# Patient Record
Sex: MALE | Race: WHITE | Employment: FULL TIME | URBAN - METROPOLITAN AREA
[De-identification: names, ages, dates, MRNs, and addresses within clinical notes are randomized per-mention and may not be internally consistent; named-entity substitution may affect disease eponyms.]

---

## 2017-08-16 ENCOUNTER — HOSPITAL ENCOUNTER (OUTPATIENT)
Facility: CLINIC | Age: 67
Discharge: HOME | End: 2017-08-16
Payer: COMMERCIAL

## 2017-08-16 ENCOUNTER — HOSPITAL ENCOUNTER (OUTPATIENT)
Dept: GENERAL RADIOLOGY | Facility: CLINIC | Age: 67
Discharge: HOME | End: 2017-08-16
Payer: COMMERCIAL

## 2017-08-16 DIAGNOSIS — M25.512 LEFT SHOULDER PAIN, UNSPECIFIED CHRONICITY: ICD-10-CM

## 2017-08-16 PROCEDURE — 73030 X-RAY EXAM OF SHOULDER: CPT

## 2017-08-17 ENCOUNTER — HOSPITAL ENCOUNTER (OUTPATIENT)
Facility: CLINIC | Age: 67
Discharge: HOME | End: 2017-08-17
Payer: COMMERCIAL

## 2017-08-17 ENCOUNTER — TELEPHONE (OUTPATIENT)
Dept: PRIMARY CARE CLINIC | Age: 67
End: 2017-08-17

## 2017-08-17 DIAGNOSIS — E11.9 TYPE 2 DIABETES MELLITUS WITHOUT COMPLICATION, WITH LONG-TERM CURRENT USE OF INSULIN (HCC): ICD-10-CM

## 2017-08-17 DIAGNOSIS — I10 ESSENTIAL HYPERTENSION: Primary | ICD-10-CM

## 2017-08-17 DIAGNOSIS — Z79.4 TYPE 2 DIABETES MELLITUS WITHOUT COMPLICATION, WITH LONG-TERM CURRENT USE OF INSULIN (HCC): ICD-10-CM

## 2017-08-17 LAB
ANION GAP SERPL CALCULATED.3IONS-SCNC: 14 MMOL/L (ref 9–17)
BUN BLDV-MCNC: 25 MG/DL (ref 8–23)
BUN/CREAT BLD: ABNORMAL (ref 9–20)
CALCIUM SERPL-MCNC: 9.6 MG/DL (ref 8.6–10.4)
CHLORIDE BLD-SCNC: 98 MMOL/L (ref 98–107)
CO2: 29 MMOL/L (ref 20–31)
CREAT SERPL-MCNC: 0.96 MG/DL (ref 0.7–1.2)
GFR AFRICAN AMERICAN: >60 ML/MIN
GFR NON-AFRICAN AMERICAN: >60 ML/MIN
GFR SERPL CREATININE-BSD FRML MDRD: ABNORMAL ML/MIN/{1.73_M2}
GFR SERPL CREATININE-BSD FRML MDRD: ABNORMAL ML/MIN/{1.73_M2}
GLUCOSE BLD-MCNC: 233 MG/DL (ref 70–99)
MAGNESIUM: 2.5 MG/DL (ref 1.6–2.6)
POTASSIUM SERPL-SCNC: 5.2 MMOL/L (ref 3.7–5.3)
SODIUM BLD-SCNC: 141 MMOL/L (ref 135–144)

## 2017-08-17 PROCEDURE — 83735 ASSAY OF MAGNESIUM: CPT

## 2017-08-17 PROCEDURE — 80048 BASIC METABOLIC PNL TOTAL CA: CPT

## 2017-08-17 PROCEDURE — 36415 COLL VENOUS BLD VENIPUNCTURE: CPT

## 2017-08-24 ENCOUNTER — TELEPHONE (OUTPATIENT)
Dept: PRIMARY CARE CLINIC | Age: 67
End: 2017-08-24

## 2017-08-24 DIAGNOSIS — I25.10 CORONARY ARTERY DISEASE INVOLVING NATIVE CORONARY ARTERY OF NATIVE HEART WITHOUT ANGINA PECTORIS: ICD-10-CM

## 2017-08-24 DIAGNOSIS — I10 ESSENTIAL HYPERTENSION: Primary | ICD-10-CM

## 2017-08-24 DIAGNOSIS — Z79.4 TYPE 2 DIABETES MELLITUS WITHOUT COMPLICATION, WITH LONG-TERM CURRENT USE OF INSULIN (HCC): ICD-10-CM

## 2017-08-24 DIAGNOSIS — E11.9 TYPE 2 DIABETES MELLITUS WITHOUT COMPLICATION, WITH LONG-TERM CURRENT USE OF INSULIN (HCC): ICD-10-CM

## 2017-09-05 DIAGNOSIS — I10 ESSENTIAL HYPERTENSION: ICD-10-CM

## 2017-09-05 RX ORDER — CARVEDILOL 25 MG/1
25 TABLET ORAL 2 TIMES DAILY
Qty: 180 TABLET | Refills: 3 | Status: SHIPPED | OUTPATIENT
Start: 2017-09-05

## 2017-09-05 RX ORDER — ISOSORBIDE MONONITRATE 30 MG/1
30 TABLET, EXTENDED RELEASE ORAL DAILY
Qty: 90 TABLET | Refills: 3 | Status: SHIPPED | OUTPATIENT
Start: 2017-09-05

## 2017-09-05 RX ORDER — HYDROCHLOROTHIAZIDE 25 MG/1
50 TABLET ORAL DAILY
Qty: 180 TABLET | Refills: 3 | Status: SHIPPED | OUTPATIENT
Start: 2017-09-05 | End: 2018-11-15

## 2017-09-05 RX ORDER — SPIRONOLACTONE 25 MG/1
25 TABLET ORAL DAILY
Qty: 90 TABLET | Refills: 3 | Status: SHIPPED | OUTPATIENT
Start: 2017-09-05 | End: 2018-11-15 | Stop reason: SDUPTHER

## 2017-09-05 RX ORDER — GLIMEPIRIDE 4 MG/1
4 TABLET ORAL
Qty: 90 TABLET | Refills: 3 | Status: SHIPPED | OUTPATIENT
Start: 2017-09-05

## 2017-12-22 ENCOUNTER — TELEPHONE (OUTPATIENT)
Dept: PRIMARY CARE CLINIC | Age: 67
End: 2017-12-22

## 2017-12-22 DIAGNOSIS — I25.10 CORONARY ARTERY DISEASE INVOLVING NATIVE CORONARY ARTERY OF NATIVE HEART WITHOUT ANGINA PECTORIS: ICD-10-CM

## 2017-12-22 DIAGNOSIS — R97.20 ELEVATED PSA: ICD-10-CM

## 2017-12-22 DIAGNOSIS — E11.9 TYPE 2 DIABETES MELLITUS WITHOUT COMPLICATION, WITH LONG-TERM CURRENT USE OF INSULIN (HCC): ICD-10-CM

## 2017-12-22 DIAGNOSIS — Z79.4 TYPE 2 DIABETES MELLITUS WITHOUT COMPLICATION, WITH LONG-TERM CURRENT USE OF INSULIN (HCC): ICD-10-CM

## 2017-12-22 DIAGNOSIS — E83.42 HYPOMAGNESEMIA: ICD-10-CM

## 2017-12-22 DIAGNOSIS — I10 ESSENTIAL HYPERTENSION: Primary | ICD-10-CM

## 2017-12-29 ENCOUNTER — HOSPITAL ENCOUNTER (OUTPATIENT)
Facility: CLINIC | Age: 67
Discharge: HOME | End: 2017-12-29
Payer: COMMERCIAL

## 2017-12-29 DIAGNOSIS — E11.9 TYPE 2 DIABETES MELLITUS WITHOUT COMPLICATION, WITH LONG-TERM CURRENT USE OF INSULIN (HCC): ICD-10-CM

## 2017-12-29 DIAGNOSIS — Z79.4 TYPE 2 DIABETES MELLITUS WITHOUT COMPLICATION, WITH LONG-TERM CURRENT USE OF INSULIN (HCC): ICD-10-CM

## 2017-12-29 DIAGNOSIS — I10 ESSENTIAL HYPERTENSION: ICD-10-CM

## 2017-12-29 DIAGNOSIS — R97.20 ELEVATED PSA: ICD-10-CM

## 2017-12-29 DIAGNOSIS — I25.10 CORONARY ARTERY DISEASE INVOLVING NATIVE CORONARY ARTERY OF NATIVE HEART WITHOUT ANGINA PECTORIS: ICD-10-CM

## 2017-12-29 DIAGNOSIS — E83.42 HYPOMAGNESEMIA: ICD-10-CM

## 2017-12-29 LAB
ABSOLUTE EOS #: 0.22 K/UL (ref 0–0.44)
ABSOLUTE IMMATURE GRANULOCYTE: <0.03 K/UL (ref 0–0.3)
ABSOLUTE LYMPH #: 2.03 K/UL (ref 1.1–3.7)
ABSOLUTE MONO #: 0.77 K/UL (ref 0.1–1.2)
ALBUMIN SERPL-MCNC: 4 G/DL (ref 3.5–5.2)
ALBUMIN/GLOBULIN RATIO: 1.1 (ref 1–2.5)
ALP BLD-CCNC: 64 U/L (ref 40–129)
ALT SERPL-CCNC: 20 U/L (ref 5–41)
ANION GAP SERPL CALCULATED.3IONS-SCNC: 12 MMOL/L (ref 9–17)
AST SERPL-CCNC: 16 U/L
BASOPHILS # BLD: 1 % (ref 0–2)
BASOPHILS ABSOLUTE: 0.05 K/UL (ref 0–0.2)
BILIRUB SERPL-MCNC: 0.37 MG/DL (ref 0.3–1.2)
BILIRUBIN DIRECT: 0.1 MG/DL
BILIRUBIN, INDIRECT: 0.27 MG/DL (ref 0–1)
BUN BLDV-MCNC: 22 MG/DL (ref 8–23)
CALCIUM SERPL-MCNC: 9.7 MG/DL (ref 8.6–10.4)
CHLORIDE BLD-SCNC: 98 MMOL/L (ref 98–107)
CHOLESTEROL/HDL RATIO: 2.2
CHOLESTEROL: 133 MG/DL
CO2: 30 MMOL/L (ref 20–31)
CREAT SERPL-MCNC: 0.91 MG/DL (ref 0.7–1.2)
CREATININE URINE: 80.3 MG/DL (ref 39–259)
DIFFERENTIAL TYPE: NORMAL
EOSINOPHILS RELATIVE PERCENT: 3 % (ref 1–4)
ESTIMATED AVERAGE GLUCOSE: 154 MG/DL
GFR AFRICAN AMERICAN: >60 ML/MIN
GFR NON-AFRICAN AMERICAN: >60 ML/MIN
GFR SERPL CREATININE-BSD FRML MDRD: ABNORMAL ML/MIN/{1.73_M2}
GFR SERPL CREATININE-BSD FRML MDRD: ABNORMAL ML/MIN/{1.73_M2}
GLUCOSE BLD-MCNC: 117 MG/DL (ref 70–99)
HBA1C MFR BLD: 7 % (ref 4–6)
HCT VFR BLD CALC: 42.3 % (ref 40.7–50.3)
HDLC SERPL-MCNC: 60 MG/DL
HEMOGLOBIN: 13.9 G/DL (ref 13–17)
IMMATURE GRANULOCYTES: 0 %
LDL CHOLESTEROL: 60 MG/DL (ref 0–130)
LYMPHOCYTES # BLD: 29 % (ref 24–43)
MAGNESIUM: 2 MG/DL (ref 1.6–2.6)
MCH RBC QN AUTO: 31 PG (ref 25.2–33.5)
MCHC RBC AUTO-ENTMCNC: 32.9 G/DL (ref 28.4–34.8)
MCV RBC AUTO: 94.4 FL (ref 82.6–102.9)
MICROALBUMIN/CREAT 24H UR: <12 MG/L
MICROALBUMIN/CREAT UR-RTO: NORMAL MCG/MG CREAT
MONOCYTES # BLD: 11 % (ref 3–12)
PDW BLD-RTO: 12.6 % (ref 11.8–14.4)
PLATELET # BLD: 147 K/UL (ref 138–453)
PLATELET ESTIMATE: NORMAL
PMV BLD AUTO: 12.4 FL (ref 8.1–13.5)
POTASSIUM SERPL-SCNC: 4.2 MMOL/L (ref 3.7–5.3)
PROSTATE SPECIFIC ANTIGEN: 8.25 UG/L
RBC # BLD: 4.48 M/UL (ref 4.21–5.77)
RBC # BLD: NORMAL 10*6/UL
SEG NEUTROPHILS: 56 % (ref 36–65)
SEGMENTED NEUTROPHILS ABSOLUTE COUNT: 3.96 K/UL (ref 1.5–8.1)
SODIUM BLD-SCNC: 140 MMOL/L (ref 135–144)
TOTAL PROTEIN: 7.5 G/DL (ref 6.4–8.3)
TRIGL SERPL-MCNC: 65 MG/DL
VLDLC SERPL CALC-MCNC: NORMAL MG/DL (ref 1–30)
WBC # BLD: 7.1 K/UL (ref 3.5–11.3)
WBC # BLD: NORMAL 10*3/UL

## 2017-12-29 PROCEDURE — 83036 HEMOGLOBIN GLYCOSYLATED A1C: CPT

## 2017-12-29 PROCEDURE — 83735 ASSAY OF MAGNESIUM: CPT

## 2017-12-29 PROCEDURE — 36415 COLL VENOUS BLD VENIPUNCTURE: CPT

## 2017-12-29 PROCEDURE — 84154 ASSAY OF PSA FREE: CPT

## 2017-12-29 PROCEDURE — 80061 LIPID PANEL: CPT

## 2017-12-29 PROCEDURE — 82570 ASSAY OF URINE CREATININE: CPT

## 2017-12-29 PROCEDURE — 82043 UR ALBUMIN QUANTITATIVE: CPT

## 2017-12-29 PROCEDURE — 80053 COMPREHEN METABOLIC PANEL: CPT

## 2017-12-29 PROCEDURE — 84153 ASSAY OF PSA TOTAL: CPT

## 2017-12-29 PROCEDURE — 82248 BILIRUBIN DIRECT: CPT

## 2017-12-29 PROCEDURE — 85025 COMPLETE CBC W/AUTO DIFF WBC: CPT

## 2018-01-02 LAB
PROSTATE SPECIFIC ANTIGEN FREE: 1.5 UG/L
PROSTATE SPECIFIC ANTIGEN PERCENT FREE: 22.1 %
PROSTATE SPECIFIC ANTIGEN: 6.8 UG/L (ref 0–4)

## 2018-08-16 ENCOUNTER — HOSPITAL ENCOUNTER (EMERGENCY)
Facility: CLINIC | Age: 68
Discharge: HOME | End: 2018-08-16
Attending: EMERGENCY MEDICINE
Payer: COMMERCIAL

## 2018-08-16 VITALS
RESPIRATION RATE: 16 BRPM | SYSTOLIC BLOOD PRESSURE: 163 MMHG | BODY MASS INDEX: 30.68 KG/M2 | HEART RATE: 63 BPM | DIASTOLIC BLOOD PRESSURE: 73 MMHG | TEMPERATURE: 98.6 F | OXYGEN SATURATION: 99 % | WEIGHT: 220 LBS

## 2018-08-16 DIAGNOSIS — H61.22 IMPACTED CERUMEN, LEFT EAR: Primary | ICD-10-CM

## 2018-08-16 PROCEDURE — 69210 REMOVE IMPACTED EAR WAX UNI: CPT

## 2018-08-16 PROCEDURE — 99282 EMERGENCY DEPT VISIT SF MDM: CPT

## 2018-08-17 ENCOUNTER — CARE COORDINATION (OUTPATIENT)
Dept: CARE COORDINATION | Age: 68
End: 2018-08-17

## 2018-10-22 ENCOUNTER — HOSPITAL ENCOUNTER (EMERGENCY)
Age: 68
Discharge: HOME | End: 2018-10-22
Attending: EMERGENCY MEDICINE
Payer: COMMERCIAL

## 2018-10-22 ENCOUNTER — APPOINTMENT (OUTPATIENT)
Dept: GENERAL RADIOLOGY | Age: 68
End: 2018-10-22
Payer: COMMERCIAL

## 2018-10-22 VITALS
HEIGHT: 70 IN | TEMPERATURE: 98.2 F | RESPIRATION RATE: 17 BRPM | SYSTOLIC BLOOD PRESSURE: 145 MMHG | WEIGHT: 205 LBS | OXYGEN SATURATION: 93 % | HEART RATE: 63 BPM | DIASTOLIC BLOOD PRESSURE: 65 MMHG | BODY MASS INDEX: 29.35 KG/M2

## 2018-10-22 DIAGNOSIS — I10 HYPERTENSION, UNSPECIFIED TYPE: ICD-10-CM

## 2018-10-22 DIAGNOSIS — R51.9 NONINTRACTABLE HEADACHE, UNSPECIFIED CHRONICITY PATTERN, UNSPECIFIED HEADACHE TYPE: Primary | ICD-10-CM

## 2018-10-22 LAB
ABSOLUTE EOS #: 0.1 K/UL (ref 0–0.4)
ABSOLUTE IMMATURE GRANULOCYTE: ABNORMAL K/UL (ref 0–0.3)
ABSOLUTE LYMPH #: 2.4 K/UL (ref 1–4.8)
ABSOLUTE MONO #: 0.8 K/UL (ref 0.2–0.8)
ALBUMIN SERPL-MCNC: 4.2 G/DL (ref 3.5–5.2)
ALBUMIN/GLOBULIN RATIO: ABNORMAL (ref 1–2.5)
ALP BLD-CCNC: 74 U/L (ref 40–129)
ALT SERPL-CCNC: 31 U/L (ref 5–41)
ANION GAP SERPL CALCULATED.3IONS-SCNC: 11 MMOL/L (ref 9–17)
AST SERPL-CCNC: 21 U/L
BASOPHILS # BLD: 0 % (ref 0–2)
BASOPHILS ABSOLUTE: 0 K/UL (ref 0–0.2)
BILIRUB SERPL-MCNC: 0.4 MG/DL (ref 0.3–1.2)
BNP INTERPRETATION: NORMAL
BUN BLDV-MCNC: 21 MG/DL (ref 8–23)
BUN/CREAT BLD: 21 (ref 9–20)
CALCIUM SERPL-MCNC: 9.4 MG/DL (ref 8.6–10.4)
CHLORIDE BLD-SCNC: 98 MMOL/L (ref 98–107)
CO2: 27 MMOL/L (ref 20–31)
CREAT SERPL-MCNC: 0.98 MG/DL (ref 0.7–1.2)
DIFFERENTIAL TYPE: ABNORMAL
EKG ATRIAL RATE: 65 BPM
EKG P AXIS: 53 DEGREES
EKG P-R INTERVAL: 150 MS
EKG Q-T INTERVAL: 396 MS
EKG QRS DURATION: 92 MS
EKG QTC CALCULATION (BAZETT): 411 MS
EKG R AXIS: -24 DEGREES
EKG T AXIS: 58 DEGREES
EKG VENTRICULAR RATE: 65 BPM
EOSINOPHILS RELATIVE PERCENT: 1 % (ref 1–4)
GFR AFRICAN AMERICAN: >60 ML/MIN
GFR NON-AFRICAN AMERICAN: >60 ML/MIN
GFR SERPL CREATININE-BSD FRML MDRD: ABNORMAL ML/MIN/{1.73_M2}
GFR SERPL CREATININE-BSD FRML MDRD: ABNORMAL ML/MIN/{1.73_M2}
GLUCOSE BLD-MCNC: 196 MG/DL (ref 70–99)
HCT VFR BLD CALC: 41 % (ref 41–53)
HEMOGLOBIN: 13.7 G/DL (ref 13.5–17.5)
IMMATURE GRANULOCYTES: ABNORMAL %
LYMPHOCYTES # BLD: 28 % (ref 24–44)
MCH RBC QN AUTO: 31.2 PG (ref 26–34)
MCHC RBC AUTO-ENTMCNC: 33.5 G/DL (ref 31–37)
MCV RBC AUTO: 92.9 FL (ref 80–100)
MONOCYTES # BLD: 10 % (ref 1–7)
NRBC AUTOMATED: ABNORMAL PER 100 WBC
PDW BLD-RTO: 13.4 % (ref 11.5–14.5)
PLATELET # BLD: 180 K/UL (ref 130–400)
PLATELET ESTIMATE: ABNORMAL
PMV BLD AUTO: 9.4 FL (ref 6–12)
POTASSIUM SERPL-SCNC: 3.9 MMOL/L (ref 3.7–5.3)
PRO-BNP: 175 PG/ML
RBC # BLD: 4.41 M/UL (ref 4.5–5.9)
RBC # BLD: ABNORMAL 10*6/UL
SEG NEUTROPHILS: 61 % (ref 36–66)
SEGMENTED NEUTROPHILS ABSOLUTE COUNT: 5.2 K/UL (ref 1.8–7.7)
SODIUM BLD-SCNC: 136 MMOL/L (ref 135–144)
TOTAL PROTEIN: 7.7 G/DL (ref 6.4–8.3)
TROPONIN INTERP: NORMAL
TROPONIN T: <0.03 NG/ML
WBC # BLD: 8.5 K/UL (ref 3.5–11)
WBC # BLD: ABNORMAL 10*3/UL

## 2018-10-22 PROCEDURE — 93005 ELECTROCARDIOGRAM TRACING: CPT

## 2018-10-22 PROCEDURE — 85025 COMPLETE CBC W/AUTO DIFF WBC: CPT

## 2018-10-22 PROCEDURE — 99285 EMERGENCY DEPT VISIT HI MDM: CPT

## 2018-10-22 PROCEDURE — 80053 COMPREHEN METABOLIC PANEL: CPT

## 2018-10-22 PROCEDURE — 71045 X-RAY EXAM CHEST 1 VIEW: CPT

## 2018-10-22 PROCEDURE — 6370000000 HC RX 637 (ALT 250 FOR IP): Performed by: EMERGENCY MEDICINE

## 2018-10-22 PROCEDURE — 84484 ASSAY OF TROPONIN QUANT: CPT

## 2018-10-22 PROCEDURE — 81003 URINALYSIS AUTO W/O SCOPE: CPT

## 2018-10-22 PROCEDURE — 83880 ASSAY OF NATRIURETIC PEPTIDE: CPT

## 2018-10-22 RX ORDER — FUROSEMIDE 40 MG/1
40 TABLET ORAL DAILY
Status: DISCONTINUED | OUTPATIENT
Start: 2018-10-22 | End: 2018-10-22 | Stop reason: HOSPADM

## 2018-10-22 RX ADMIN — FUROSEMIDE 40 MG: 40 TABLET ORAL at 19:15

## 2018-10-22 NOTE — ED PROVIDER NOTES
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 He has also noticed increased lower extremity edema. He does have a history of mild LV dysfunction. He denies any chest pain. He found his blood pressure to be significantly elevated today while at work. He admits that he woke up feeling more stress today which often leads his blood pressure to be elevated. He has not missed any doses of his antihypertensives. He does admit to eating more salty foods over the past week. He denies any shortness of breath at rest.     Nursing Notes were reviewed. ALLERGIES     Ace inhibitors; Diovan [valsartan]; Januvia [sitagliptin];  Lisinopril; Peanuts [peanut oil]; and Tekturna [aliskiren]    CURRENT MEDICATIONS       Discharge Medication List as of 10/22/2018  7:16 PM      CONTINUE these medications which have NOT CHANGED    Details   glimepiride (AMARYL) 4 MG tablet Take 1 tablet by mouth every morning (before breakfast), Disp-90 tablet, R-3Normal      spironolactone (ALDACTONE) 25 MG tablet Take 1 tablet by mouth daily, Disp-90 tablet, R-3Normal      isosorbide mononitrate (IMDUR) 30 MG extended release tablet Take 1 tablet by mouth daily, Disp-90 tablet, R-3Normal      hydrochlorothiazide (HYDRODIURIL) 25 MG tablet Take 2 tablets by mouth daily, Disp-180 tablet, R-3Normal      carvedilol (COREG) 25 MG tablet Take 1 tablet by mouth 2 times daily, Disp-180 tablet, R-3Normal      potassium chloride SA (K-DUR;KLOR-CON M) 20 MEQ tablet R-0      magnesium oxide (MAG-OX) 400 MG tablet Take 400 mg by mouth 2 times daily      aspirin 81 MG tablet Take 81 mg by mouth daily      celecoxib (CELEBREX) 100 MG capsule Take 100 mg by mouth 2 times daily      Hydrazine Sulfate KALI 25 mg by Does not apply route 4 times daily      omeprazole (PRILOSEC) 20 MG capsule Take 40 mg by mouth daily      tamsulosin (FLOMAX) 0.4 MG capsule Take 0.4 mg by mouth 2 times daily      clopidogrel (PLAVIX) 75 MG tablet Take 75 mg by mouth daily      folic acid (FOLVITE) 028 MCG tablet Take 800 mcg by mouth 3 times daily      vitamin B-12 (CYANOCOBALAMIN) 1000 MCG tablet Take 1,500 mcg by mouth daily      niacin (SLO-NIACIN) 500 MG tablet Take 2,000 mg by mouth nightly      Multiple Vitamins-Minerals (THERAPEUTIC MULTIVITAMIN-MINERALS) tablet Take 1 tablet by mouth daily      cetirizine (ZYRTEC) 10 MG tablet Take 10 mg by mouth daily      ranitidine (ZANTAC) 150 MG tablet Take 150 mg by mouth 2 times daily      insulin glargine (LANTUS) 100 UNIT/ML injection vial Inject 30 Units into the skin nightly      atorvastatin (LIPITOR) 80 MG tablet Take 1 tablet by mouth nightly., Disp-30 tablet, R-11             PAST MEDICAL HISTORY         Diagnosis Date    BPH (benign prostatic hyperplasia)     CAD (coronary artery disease)     GERD (gastroesophageal reflux disease)     Hyperlipidemia     Hypertension     Osteoarthritis     Thrush 2015    Type II or unspecified type diabetes mellitus without mention of complication, not stated as uncontrolled        SURGICAL HISTORY           Procedure Laterality Date    CARPAL TUNNEL RELEASE      bilateral    FINGER TRIGGER RELEASE      right thumb    HERNIA REPAIR      right inguinal hernia    TONSILLECTOMY AND ADENOIDECTOMY         FAMILY HISTORY       Family History   Problem Relation Age of Onset    Heart Disease Mother     Other Mother     Heart Disease Father     Heart Disease Paternal Uncle     Heart Disease Maternal Grandmother     Diabetes Paternal Grandmother     Cancer Paternal Grandfather      Family Status   Relation Status    Mother     Father     PUnc (Not Specified)    MGM (Not Specified)    PGM (Not Specified)    PGF (Not Specified)        SOCIAL HISTORY      reports that he has never smoked. He has never used smokeless tobacco. He reports that he does not drink alcohol or use drugs. REVIEW OF SYSTEMS    (2-9 systems for level 4, 10 or more for level 5)     Review of Systems  GEN: no fevers  CV: No CP, No palpitations  Pulm: +PUNETES  GI: No abdominal pain, No Nausea, No Vomiting  Neuro: +HA  MSK: No msk injuries    Except as noted above the remainder of the review of systems was reviewed and negative. PHYSICAL EXAM    (up to 7 for level 4, 8 or more for level 5)     ED Triage Vitals [10/22/18 1722]   BP Temp Temp src Pulse Resp SpO2 Height Weight   (!) 163/74 98.2 °F (36.8 °C) -- 68 20 97 % 5' 10\" (1.778 m) 205 lb (93 kg)     Physical Exam   Constitutional: He is oriented to person, place, and time. He appears well-developed and well-nourished. No distress. HENT:   Head: Normocephalic and atraumatic. Eyes: EOM are normal.   Neck: Normal range of motion. Neck supple. Cardiovascular: Normal rate, regular rhythm, normal heart sounds and intact distal pulses. No murmur heard. Pulmonary/Chest: Effort normal and breath sounds normal. No respiratory distress. Abdominal: Soft. He exhibits no distension. There is no tenderness. Musculoskeletal: Normal range of motion. He exhibits edema (2+ LE edema equal b/l). He exhibits no tenderness or deformity. Neurological: He is alert and oriented to person, place, and time. Skin: Skin is warm and dry. He is not diaphoretic. Psychiatric: He has a normal mood and affect. His behavior is normal. Judgment and thought content normal.   Nursing note and vitals reviewed.       DIAGNOSTIC RESULTS     EKG:All EKG's are interpreted by the Emergency

## 2018-11-08 ENCOUNTER — HOSPITAL ENCOUNTER (OUTPATIENT)
Facility: CLINIC | Age: 68
Discharge: HOME | End: 2018-11-08
Payer: COMMERCIAL

## 2018-11-08 LAB
ANION GAP SERPL CALCULATED.3IONS-SCNC: 12 MMOL/L (ref 9–17)
BUN BLDV-MCNC: 24 MG/DL (ref 8–23)
BUN/CREAT BLD: ABNORMAL (ref 9–20)
CALCIUM SERPL-MCNC: 9.8 MG/DL (ref 8.6–10.4)
CHLORIDE BLD-SCNC: 96 MMOL/L (ref 98–107)
CO2: 31 MMOL/L (ref 20–31)
CREAT SERPL-MCNC: 1.05 MG/DL (ref 0.7–1.2)
GFR AFRICAN AMERICAN: >60 ML/MIN
GFR NON-AFRICAN AMERICAN: >60 ML/MIN
GFR SERPL CREATININE-BSD FRML MDRD: ABNORMAL ML/MIN/{1.73_M2}
GFR SERPL CREATININE-BSD FRML MDRD: ABNORMAL ML/MIN/{1.73_M2}
GLUCOSE BLD-MCNC: 144 MG/DL (ref 70–99)
MAGNESIUM: 2.1 MG/DL (ref 1.6–2.6)
POTASSIUM SERPL-SCNC: 4.1 MMOL/L (ref 3.7–5.3)
SODIUM BLD-SCNC: 139 MMOL/L (ref 135–144)

## 2018-11-08 PROCEDURE — 80048 BASIC METABOLIC PNL TOTAL CA: CPT

## 2018-11-08 PROCEDURE — 36415 COLL VENOUS BLD VENIPUNCTURE: CPT

## 2018-11-08 PROCEDURE — 83735 ASSAY OF MAGNESIUM: CPT

## 2018-11-26 ENCOUNTER — HOSPITAL ENCOUNTER (OUTPATIENT)
Facility: CLINIC | Age: 68
Discharge: HOME | End: 2018-11-26
Payer: COMMERCIAL

## 2018-11-26 DIAGNOSIS — I10 ESSENTIAL HYPERTENSION: ICD-10-CM

## 2018-11-26 LAB
ANION GAP SERPL CALCULATED.3IONS-SCNC: 13 MMOL/L (ref 9–17)
BUN BLDV-MCNC: 29 MG/DL (ref 8–23)
BUN/CREAT BLD: ABNORMAL (ref 9–20)
CALCIUM SERPL-MCNC: 9.8 MG/DL (ref 8.6–10.4)
CHLORIDE BLD-SCNC: 95 MMOL/L (ref 98–107)
CO2: 28 MMOL/L (ref 20–31)
CREAT SERPL-MCNC: 1.1 MG/DL (ref 0.7–1.2)
GFR AFRICAN AMERICAN: >60 ML/MIN
GFR NON-AFRICAN AMERICAN: >60 ML/MIN
GFR SERPL CREATININE-BSD FRML MDRD: ABNORMAL ML/MIN/{1.73_M2}
GFR SERPL CREATININE-BSD FRML MDRD: ABNORMAL ML/MIN/{1.73_M2}
GLUCOSE BLD-MCNC: 287 MG/DL (ref 70–99)
POTASSIUM SERPL-SCNC: 4.5 MMOL/L (ref 3.7–5.3)
SODIUM BLD-SCNC: 136 MMOL/L (ref 135–144)

## 2018-11-26 PROCEDURE — 36415 COLL VENOUS BLD VENIPUNCTURE: CPT

## 2018-11-26 PROCEDURE — 80048 BASIC METABOLIC PNL TOTAL CA: CPT

## 2018-11-28 ENCOUNTER — NURSE TRIAGE (OUTPATIENT)
Dept: OTHER | Facility: CLINIC | Age: 68
End: 2018-11-28

## 2018-12-14 ENCOUNTER — HOSPITAL ENCOUNTER (OUTPATIENT)
Facility: CLINIC | Age: 68
Discharge: HOME | End: 2018-12-14
Payer: COMMERCIAL

## 2018-12-14 LAB
ANION GAP SERPL CALCULATED.3IONS-SCNC: 9 MMOL/L (ref 9–17)
BUN BLDV-MCNC: 28 MG/DL (ref 8–23)
BUN/CREAT BLD: ABNORMAL (ref 9–20)
CALCIUM SERPL-MCNC: 9.2 MG/DL (ref 8.6–10.4)
CHLORIDE BLD-SCNC: 97 MMOL/L (ref 98–107)
CO2: 29 MMOL/L (ref 20–31)
CREAT SERPL-MCNC: 0.97 MG/DL (ref 0.7–1.2)
GFR AFRICAN AMERICAN: >60 ML/MIN
GFR NON-AFRICAN AMERICAN: >60 ML/MIN
GFR SERPL CREATININE-BSD FRML MDRD: ABNORMAL ML/MIN/{1.73_M2}
GFR SERPL CREATININE-BSD FRML MDRD: ABNORMAL ML/MIN/{1.73_M2}
GLUCOSE BLD-MCNC: 198 MG/DL (ref 70–99)
MAGNESIUM: 2 MG/DL (ref 1.6–2.6)
POTASSIUM SERPL-SCNC: 4 MMOL/L (ref 3.7–5.3)
SODIUM BLD-SCNC: 135 MMOL/L (ref 135–144)

## 2018-12-14 PROCEDURE — 36415 COLL VENOUS BLD VENIPUNCTURE: CPT

## 2018-12-14 PROCEDURE — 83735 ASSAY OF MAGNESIUM: CPT

## 2018-12-14 PROCEDURE — 80048 BASIC METABOLIC PNL TOTAL CA: CPT

## 2018-12-27 ENCOUNTER — HOSPITAL ENCOUNTER (OUTPATIENT)
Facility: CLINIC | Age: 68
Discharge: HOME | End: 2018-12-27
Payer: COMMERCIAL

## 2018-12-27 LAB
ALBUMIN SERPL-MCNC: 4.2 G/DL (ref 3.5–5.2)
ALBUMIN/GLOBULIN RATIO: 1.2 (ref 1–2.5)
ALP BLD-CCNC: 74 U/L (ref 40–129)
ALT SERPL-CCNC: 22 U/L (ref 5–41)
ANION GAP SERPL CALCULATED.3IONS-SCNC: 18 MMOL/L (ref 9–17)
AST SERPL-CCNC: 16 U/L
BILIRUB SERPL-MCNC: 0.42 MG/DL (ref 0.3–1.2)
BUN BLDV-MCNC: 27 MG/DL (ref 8–23)
BUN/CREAT BLD: ABNORMAL (ref 9–20)
CALCIUM SERPL-MCNC: 9.7 MG/DL (ref 8.6–10.4)
CHLORIDE BLD-SCNC: 101 MMOL/L (ref 98–107)
CHOLESTEROL/HDL RATIO: 2.4
CHOLESTEROL: 132 MG/DL
CO2: 26 MMOL/L (ref 20–31)
CREAT SERPL-MCNC: 0.96 MG/DL (ref 0.7–1.2)
ESTIMATED AVERAGE GLUCOSE: 177 MG/DL
GFR AFRICAN AMERICAN: >60 ML/MIN
GFR NON-AFRICAN AMERICAN: >60 ML/MIN
GFR SERPL CREATININE-BSD FRML MDRD: ABNORMAL ML/MIN/{1.73_M2}
GFR SERPL CREATININE-BSD FRML MDRD: ABNORMAL ML/MIN/{1.73_M2}
GLUCOSE BLD-MCNC: 152 MG/DL (ref 70–99)
HBA1C MFR BLD: 7.8 % (ref 4–6)
HCT VFR BLD CALC: 43.6 % (ref 40.7–50.3)
HDLC SERPL-MCNC: 54 MG/DL
HEMOGLOBIN: 14.6 G/DL (ref 13–17)
LDL CHOLESTEROL: 66 MG/DL (ref 0–130)
MAGNESIUM: 2 MG/DL (ref 1.6–2.6)
MCH RBC QN AUTO: 31 PG (ref 25.2–33.5)
MCHC RBC AUTO-ENTMCNC: 33.5 G/DL (ref 28.4–34.8)
MCV RBC AUTO: 92.6 FL (ref 82.6–102.9)
NRBC AUTOMATED: 0 PER 100 WBC
PDW BLD-RTO: 13 % (ref 11.8–14.4)
PLATELET # BLD: 155 K/UL (ref 138–453)
PMV BLD AUTO: 12 FL (ref 8.1–13.5)
POTASSIUM SERPL-SCNC: 4.1 MMOL/L (ref 3.7–5.3)
PROSTATE SPECIFIC ANTIGEN: 7.01 UG/L
RBC # BLD: 4.71 M/UL (ref 4.21–5.77)
SODIUM BLD-SCNC: 145 MMOL/L (ref 135–144)
T4 TOTAL: 6.1 UG/DL (ref 4.5–12)
TOTAL PROTEIN: 7.8 G/DL (ref 6.4–8.3)
TRIGL SERPL-MCNC: 60 MG/DL
TSH SERPL DL<=0.05 MIU/L-ACNC: 2.24 MIU/L (ref 0.3–5)
VLDLC SERPL CALC-MCNC: NORMAL MG/DL (ref 1–30)
WBC # BLD: 6.8 K/UL (ref 3.5–11.3)

## 2018-12-27 PROCEDURE — 84436 ASSAY OF TOTAL THYROXINE: CPT

## 2018-12-27 PROCEDURE — 84443 ASSAY THYROID STIM HORMONE: CPT

## 2018-12-27 PROCEDURE — 84153 ASSAY OF PSA TOTAL: CPT

## 2018-12-27 PROCEDURE — 80061 LIPID PANEL: CPT

## 2018-12-27 PROCEDURE — 36415 COLL VENOUS BLD VENIPUNCTURE: CPT

## 2018-12-27 PROCEDURE — 85027 COMPLETE CBC AUTOMATED: CPT

## 2018-12-27 PROCEDURE — 83735 ASSAY OF MAGNESIUM: CPT

## 2018-12-27 PROCEDURE — 83036 HEMOGLOBIN GLYCOSYLATED A1C: CPT

## 2018-12-27 PROCEDURE — 84154 ASSAY OF PSA FREE: CPT

## 2018-12-27 PROCEDURE — 80053 COMPREHEN METABOLIC PANEL: CPT

## 2018-12-28 LAB
PROSTATE SPECIFIC ANTIGEN FREE: 1.9 UG/L
PROSTATE SPECIFIC ANTIGEN PERCENT FREE: 30.6 %
PROSTATE SPECIFIC ANTIGEN: 6.2 UG/L (ref 0–4)

## 2019-10-12 ENCOUNTER — HOSPITAL ENCOUNTER (OUTPATIENT)
Facility: CLINIC | Age: 69
Discharge: HOME | End: 2019-10-12
Payer: MEDICARE

## 2019-10-12 LAB
ALBUMIN SERPL-MCNC: 4.4 G/DL (ref 3.5–5.2)
ALBUMIN/GLOBULIN RATIO: 1.3 (ref 1–2.5)
ALP BLD-CCNC: 65 U/L (ref 40–129)
ALT SERPL-CCNC: 19 U/L (ref 5–41)
ANION GAP SERPL CALCULATED.3IONS-SCNC: 13 MMOL/L (ref 9–17)
AST SERPL-CCNC: 18 U/L
BILIRUB SERPL-MCNC: 0.35 MG/DL (ref 0.3–1.2)
BUN BLDV-MCNC: 28 MG/DL (ref 8–23)
BUN/CREAT BLD: ABNORMAL (ref 9–20)
CALCIUM SERPL-MCNC: 10.1 MG/DL (ref 8.6–10.4)
CHLORIDE BLD-SCNC: 101 MMOL/L (ref 98–107)
CHOLESTEROL/HDL RATIO: 2.1
CHOLESTEROL: 131 MG/DL
CO2: 27 MMOL/L (ref 20–31)
CREAT SERPL-MCNC: 0.99 MG/DL (ref 0.7–1.2)
GFR AFRICAN AMERICAN: >60 ML/MIN
GFR NON-AFRICAN AMERICAN: >60 ML/MIN
GFR SERPL CREATININE-BSD FRML MDRD: ABNORMAL ML/MIN/{1.73_M2}
GFR SERPL CREATININE-BSD FRML MDRD: ABNORMAL ML/MIN/{1.73_M2}
GLUCOSE BLD-MCNC: 130 MG/DL (ref 70–99)
HCT VFR BLD CALC: 43.2 % (ref 40.7–50.3)
HDLC SERPL-MCNC: 62 MG/DL
HEMOGLOBIN: 14.4 G/DL (ref 13–17)
LDL CHOLESTEROL: 59 MG/DL (ref 0–130)
MAGNESIUM: 1.8 MG/DL (ref 1.6–2.6)
MCH RBC QN AUTO: 32 PG (ref 25.2–33.5)
MCHC RBC AUTO-ENTMCNC: 33.3 G/DL (ref 28.4–34.8)
MCV RBC AUTO: 96 FL (ref 82.6–102.9)
NRBC AUTOMATED: 0 PER 100 WBC
PDW BLD-RTO: 12.5 % (ref 11.8–14.4)
PLATELET # BLD: 154 K/UL (ref 138–453)
PMV BLD AUTO: 12.5 FL (ref 8.1–13.5)
POTASSIUM SERPL-SCNC: 4.3 MMOL/L (ref 3.7–5.3)
PROSTATE SPECIFIC ANTIGEN: 6.69 UG/L
RBC # BLD: 4.5 M/UL (ref 4.21–5.77)
SEDIMENTATION RATE, ERYTHROCYTE: 15 MM (ref 0–10)
SODIUM BLD-SCNC: 141 MMOL/L (ref 135–144)
T3 TOTAL: 96 NG/DL (ref 80–200)
T4 TOTAL: 5.3 UG/DL (ref 4.5–12)
TOTAL PROTEIN: 7.9 G/DL (ref 6.4–8.3)
TRIGL SERPL-MCNC: 50 MG/DL
TSH SERPL DL<=0.05 MIU/L-ACNC: 2.01 MIU/L (ref 0.3–5)
VLDLC SERPL CALC-MCNC: NORMAL MG/DL (ref 1–30)
WBC # BLD: 6.4 K/UL (ref 3.5–11.3)

## 2019-10-12 PROCEDURE — 36415 COLL VENOUS BLD VENIPUNCTURE: CPT

## 2019-10-12 PROCEDURE — 80053 COMPREHEN METABOLIC PANEL: CPT

## 2019-10-12 PROCEDURE — 80061 LIPID PANEL: CPT

## 2019-10-12 PROCEDURE — 84443 ASSAY THYROID STIM HORMONE: CPT

## 2019-10-12 PROCEDURE — 84154 ASSAY OF PSA FREE: CPT

## 2019-10-12 PROCEDURE — 84436 ASSAY OF TOTAL THYROXINE: CPT

## 2019-10-12 PROCEDURE — 85651 RBC SED RATE NONAUTOMATED: CPT

## 2019-10-12 PROCEDURE — 84480 ASSAY TRIIODOTHYRONINE (T3): CPT

## 2019-10-12 PROCEDURE — 83036 HEMOGLOBIN GLYCOSYLATED A1C: CPT

## 2019-10-12 PROCEDURE — G0103 PSA SCREENING: HCPCS

## 2019-10-12 PROCEDURE — 85027 COMPLETE CBC AUTOMATED: CPT

## 2019-10-12 PROCEDURE — 83735 ASSAY OF MAGNESIUM: CPT

## 2019-10-14 LAB
ESTIMATED AVERAGE GLUCOSE: 157 MG/DL
HBA1C MFR BLD: 7.1 % (ref 4–6)
PROSTATE SPECIFIC ANTIGEN FREE: 1.6 UG/L
PROSTATE SPECIFIC ANTIGEN PERCENT FREE: 29.1 %
PROSTATE SPECIFIC ANTIGEN: 5.5 UG/L (ref 0–4)

## 2019-11-07 ENCOUNTER — HOSPITAL ENCOUNTER (OUTPATIENT)
Dept: PHARMACY | Age: 69
Setting detail: THERAPIES SERIES
Discharge: HOME | End: 2019-11-07
Payer: COMMERCIAL

## 2019-11-07 PROCEDURE — 90670 PCV13 VACCINE IM: CPT | Performed by: INTERNAL MEDICINE

## 2019-11-07 PROCEDURE — 6360000002 HC RX W HCPCS: Performed by: INTERNAL MEDICINE

## 2019-11-07 PROCEDURE — G0009 ADMIN PNEUMOCOCCAL VACCINE: HCPCS | Performed by: INTERNAL MEDICINE

## 2019-11-07 RX ADMIN — PNEUMOCOCCAL 13-VALENT CONJUGATE VACCINE 0.5 ML: 2.2; 2.2; 2.2; 2.2; 2.2; 4.4; 2.2; 2.2; 2.2; 2.2; 2.2; 2.2; 2.2 INJECTION, SUSPENSION INTRAMUSCULAR at 09:50

## 2020-10-15 ENCOUNTER — HOSPITAL ENCOUNTER (OUTPATIENT)
Facility: CLINIC | Age: 70
Discharge: HOME | End: 2020-10-15
Payer: MEDICARE

## 2020-10-15 LAB
ALBUMIN SERPL-MCNC: 4.3 G/DL (ref 3.5–5.2)
ALBUMIN/GLOBULIN RATIO: 1.3 (ref 1–2.5)
ALP BLD-CCNC: 66 U/L (ref 40–129)
ALT SERPL-CCNC: 16 U/L (ref 5–41)
ANION GAP SERPL CALCULATED.3IONS-SCNC: 10 MMOL/L (ref 9–17)
AST SERPL-CCNC: 23 U/L
BILIRUB SERPL-MCNC: 0.45 MG/DL (ref 0.3–1.2)
BUN BLDV-MCNC: 34 MG/DL (ref 8–23)
BUN/CREAT BLD: ABNORMAL (ref 9–20)
CALCIUM SERPL-MCNC: 9.7 MG/DL (ref 8.6–10.4)
CHLORIDE BLD-SCNC: 101 MMOL/L (ref 98–107)
CHOLESTEROL/HDL RATIO: 2.3
CHOLESTEROL: 135 MG/DL
CO2: 28 MMOL/L (ref 20–31)
CREAT SERPL-MCNC: 1.11 MG/DL (ref 0.7–1.2)
ESTIMATED AVERAGE GLUCOSE: 160 MG/DL
GFR AFRICAN AMERICAN: >60 ML/MIN
GFR NON-AFRICAN AMERICAN: >60 ML/MIN
GFR SERPL CREATININE-BSD FRML MDRD: ABNORMAL ML/MIN/{1.73_M2}
GFR SERPL CREATININE-BSD FRML MDRD: ABNORMAL ML/MIN/{1.73_M2}
GLUCOSE BLD-MCNC: 126 MG/DL (ref 70–99)
HBA1C MFR BLD: 7.2 % (ref 4–6)
HCT VFR BLD CALC: 41.3 % (ref 40.7–50.3)
HDLC SERPL-MCNC: 58 MG/DL
HEMOGLOBIN: 14 G/DL (ref 13–17)
LDL CHOLESTEROL: 58 MG/DL (ref 0–130)
MAGNESIUM: 1.9 MG/DL (ref 1.6–2.6)
MCH RBC QN AUTO: 32.9 PG (ref 25.2–33.5)
MCHC RBC AUTO-ENTMCNC: 33.9 G/DL (ref 28.4–34.8)
MCV RBC AUTO: 96.9 FL (ref 82.6–102.9)
MYOGLOBIN: 63 NG/ML (ref 28–72)
NRBC AUTOMATED: 0 PER 100 WBC
PDW BLD-RTO: 12.7 % (ref 11.8–14.4)
PLATELET # BLD: 155 K/UL (ref 138–453)
PMV BLD AUTO: 11.8 FL (ref 8.1–13.5)
POTASSIUM SERPL-SCNC: 4.1 MMOL/L (ref 3.7–5.3)
PROSTATE SPECIFIC ANTIGEN: 5.49 UG/L
RBC # BLD: 4.26 M/UL (ref 4.21–5.77)
SEDIMENTATION RATE, ERYTHROCYTE: 5 MM (ref 0–20)
SODIUM BLD-SCNC: 139 MMOL/L (ref 135–144)
T3 TOTAL: 65 NG/DL (ref 60–181)
T4 TOTAL: 5.8 UG/DL (ref 4.5–10.9)
TOTAL CK: 127 U/L (ref 39–308)
TOTAL PROTEIN: 7.5 G/DL (ref 6.4–8.3)
TRIGL SERPL-MCNC: 94 MG/DL
TSH SERPL DL<=0.05 MIU/L-ACNC: 1.55 MIU/L (ref 0.3–5)
VLDLC SERPL CALC-MCNC: NORMAL MG/DL (ref 1–30)
WBC # BLD: 6.7 K/UL (ref 3.5–11.3)

## 2020-10-15 PROCEDURE — 80061 LIPID PANEL: CPT

## 2020-10-15 PROCEDURE — 85027 COMPLETE CBC AUTOMATED: CPT

## 2020-10-15 PROCEDURE — 83036 HEMOGLOBIN GLYCOSYLATED A1C: CPT

## 2020-10-15 PROCEDURE — 83874 ASSAY OF MYOGLOBIN: CPT

## 2020-10-15 PROCEDURE — 80053 COMPREHEN METABOLIC PANEL: CPT

## 2020-10-15 PROCEDURE — 85652 RBC SED RATE AUTOMATED: CPT

## 2020-10-15 PROCEDURE — 84153 ASSAY OF PSA TOTAL: CPT

## 2020-10-15 PROCEDURE — 84154 ASSAY OF PSA FREE: CPT

## 2020-10-15 PROCEDURE — 84443 ASSAY THYROID STIM HORMONE: CPT

## 2020-10-15 PROCEDURE — 84480 ASSAY TRIIODOTHYRONINE (T3): CPT

## 2020-10-15 PROCEDURE — 36415 COLL VENOUS BLD VENIPUNCTURE: CPT

## 2020-10-15 PROCEDURE — 83735 ASSAY OF MAGNESIUM: CPT

## 2020-10-15 PROCEDURE — 82550 ASSAY OF CK (CPK): CPT

## 2020-10-15 PROCEDURE — 84436 ASSAY OF TOTAL THYROXINE: CPT

## 2020-10-16 LAB
PROSTATE SPECIFIC ANTIGEN FREE: 1.5 UG/L
PROSTATE SPECIFIC ANTIGEN PERCENT FREE: 31.9 %
PROSTATE SPECIFIC ANTIGEN: 4.7 UG/L (ref 0–4)

## 2021-01-14 ENCOUNTER — HOSPITAL ENCOUNTER (OUTPATIENT)
Dept: PHARMACY | Age: 71
Setting detail: THERAPIES SERIES
Discharge: HOME | End: 2021-01-14
Payer: COMMERCIAL

## 2021-02-06 ENCOUNTER — HOSPITAL ENCOUNTER (EMERGENCY)
Facility: CLINIC | Age: 71
Discharge: HOME | End: 2021-02-06
Attending: EMERGENCY MEDICINE
Payer: MEDICARE

## 2021-02-06 ENCOUNTER — APPOINTMENT (OUTPATIENT)
Dept: GENERAL RADIOLOGY | Facility: CLINIC | Age: 71
End: 2021-02-06
Payer: MEDICARE

## 2021-02-06 VITALS
SYSTOLIC BLOOD PRESSURE: 144 MMHG | RESPIRATION RATE: 16 BRPM | TEMPERATURE: 98.3 F | BODY MASS INDEX: 24.91 KG/M2 | DIASTOLIC BLOOD PRESSURE: 75 MMHG | WEIGHT: 174 LBS | OXYGEN SATURATION: 97 % | HEART RATE: 65 BPM | HEIGHT: 70 IN

## 2021-02-06 DIAGNOSIS — S63.502A SPRAIN OF LEFT WRIST, INITIAL ENCOUNTER: Primary | ICD-10-CM

## 2021-02-06 PROCEDURE — 99282 EMERGENCY DEPT VISIT SF MDM: CPT

## 2021-02-06 PROCEDURE — 73110 X-RAY EXAM OF WRIST: CPT

## 2021-02-06 PROCEDURE — 73130 X-RAY EXAM OF HAND: CPT

## 2021-02-06 PROCEDURE — 29125 APPL SHORT ARM SPLINT STATIC: CPT

## 2021-02-06 NOTE — ED PROVIDER NOTES
[facility]  [address] [facility]  Phone: [phone]  [place]      Pt Name: Jesus Mcmillan MD  MRN: [id number]  [place] 1950  Date of evaluation: 2/6/2021    CHIEF COMPLAINT       Chief Complaint   Patient presents with    Wrist Pain     Fall onto left wrist yesterday, noticable swelling. Pt currently on Celebrex. 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 He did land on his knees but denies any pain there he is only complaining of left hand pain. Bruising and swelling today. No paresthesias or weakness no elbow or shoulder pain. Significant history of arthritis with chronic decreased range of motion. REVIEW OF SYSTEMS       Constitutional: No fevers or chills  Musculoskeletal: Left wrist pain  Skin: No rash Left upper extremity  Neurological: No paresthesias or weakness left upper extremity    PAST MEDICAL HISTORY    has a past medical history of BPH (benign prostatic hyperplasia), CAD (coronary artery disease), GERD (gastroesophageal reflux disease), Hyperlipidemia, Hypertension, Osteoarthritis, Thrush, and Type II or unspecified type diabetes mellitus without mention of complication, not stated as uncontrolled. SURGICAL HISTORY      has a past surgical history that includes hernia repair; Carpal tunnel release; Finger trigger release; and Tonsillectomy and adenoidectomy. CURRENT MEDICATIONS       Previous Medications    ASPIRIN 81 MG TABLET    Take 81 mg by mouth daily    ATORVASTATIN (LIPITOR) 80 MG TABLET    Take 1 tablet by mouth nightly.     CARVEDILOL (COREG) 25 MG TABLET    Take 1 tablet by mouth 2 times daily    CELECOXIB (CELEBREX) 100 MG CAPSULE    Take 100 mg by mouth 2 times daily    CETIRIZINE (ZYRTEC) 10 MG TABLET    Take 10 mg by mouth daily    CHLORTHALIDONE (HYGROTON) 25 MG does not drink alcohol or use drugs. PHYSICAL EXAM       ED Triage Vitals [02/06/21 1255]   BP Temp Temp Source Pulse Resp SpO2 Height Weight   (!) 144/75 98.3 °F (36.8 °C) Oral 65 16 97 % 5' 10\" (1.778 m) 174 lb (78.9 kg)       Constitutional: Alert, oriented x3, nontoxic, answering questions appropriately, acting properly for age, in no acute distress   HEENT: Extraocular muscles intact,  Neck: Trachea midline,   Musculoskeletal: Left upper extremity no pain at the shoulder or elbow there is tenderness to the dorsum of the wrist and hand with some swelling and ecchymosis no deformity. No snuffbox tenderness. Decreased range of motion  Neurologic:  left upper extremity motor and sensory intact. Skin: Warm and dry     DIFFERENTIAL DIAGNOSIS/ MDM:     X-ray left wrist and hand    DIAGNOSTIC RESULTS     EKG: All EKG's are interpreted by the Emergency Department Physician who either signs or Co-signs this chart in the absence of a cardiologist.        Not indicated unless otherwise documented above    LABS:  No results found for this visit on 02/06/21. Not indicated unless otherwise documented above    RADIOLOGY:   I reviewed the radiologist interpretations:    XR HAND LEFT (MIN 3 VIEWS)   Final Result   1. Advanced arthropathy in the wrist may represent chronic inflammatory or   crystal induced arthropathy. 2. Mild soft tissue swelling about the wrist.  No acute osseous abnormality   identified in the hand or wrist.         XR WRIST LEFT (MIN 3 VIEWS)   Final Result   1. Advanced arthropathy in the wrist may represent chronic inflammatory or   crystal induced arthropathy. 2. Mild soft tissue swelling about the wrist.  No acute osseous abnormality   identified in the hand or wrist.             Not indicated unless otherwise documented above    EMERGENCY DEPARTMENT COURSE:     The patient was given the following medications:  No orders of the defined types were placed in this encounter.        Vitals: -------------------------  BP (!) 144/75   Pulse 65   Temp 98.3 °F (36.8 °C) (Oral)   Resp 16   Ht 5' 10\" (1.778 m)   Wt 78.9 kg (174 lb)   SpO2 97%   BMI 24.97 kg/m²     X-ray of the left hand and wrist show some degenerative changes especially over the wrist bones. There appears to be a small area that may be an avulsion fracture. Given a Velcro splint. Follow-up with orthopedics and return if worsening symptoms or other concerns. I have reviewed the disposition diagnosis with the patient and or their family/guardian. I have answered their questions and given discharge instructions. They voiced understanding of these instructions and did not have any furtherquestions or complaints. CRITICAL CARE:    None    CONSULTS:    None    PROCEDURES:    None      OARRS Report if indicated             FINAL IMPRESSION      1.  Sprain of left wrist, initial encounter          DISPOSITION/PLAN   DISPOSITION Discharge - Pending Orders Complete 02/06/2021 01:33:45 PM        CONDITION ON DISPOSITION: STABLE       PATIENT REFERRED TO:  Rashard Dixon, [address]  [phone]    Schedule an appointment as soon as possible for a visit         DISCHARGE MEDICATIONS:  New Prescriptions    No medications on file       (Please note that portions of thisnote were completed with a voice recognition program.  Efforts were made to edit the dictations but occasionally words are mis-transcribed.)    Estrada DO  Attending Emergency Physician     Juan Santana DO  02/06/21 0780

## 2021-06-11 ENCOUNTER — HOSPITAL ENCOUNTER (EMERGENCY)
Facility: CLINIC | Age: 71
Discharge: HOME | End: 2021-06-11
Attending: EMERGENCY MEDICINE
Payer: MEDICARE

## 2021-06-11 ENCOUNTER — APPOINTMENT (OUTPATIENT)
Dept: CT IMAGING | Facility: CLINIC | Age: 71
End: 2021-06-11
Payer: MEDICARE

## 2021-06-11 VITALS
RESPIRATION RATE: 18 BRPM | BODY MASS INDEX: 25.76 KG/M2 | SYSTOLIC BLOOD PRESSURE: 148 MMHG | DIASTOLIC BLOOD PRESSURE: 79 MMHG | HEART RATE: 62 BPM | OXYGEN SATURATION: 98 % | WEIGHT: 179.5 LBS

## 2021-06-11 DIAGNOSIS — R10.84 GENERALIZED ABDOMINAL PAIN: Primary | ICD-10-CM

## 2021-06-11 DIAGNOSIS — I10 ESSENTIAL HYPERTENSION: ICD-10-CM

## 2021-06-11 LAB
ABSOLUTE EOS #: 0.4 K/UL (ref 0–0.4)
ABSOLUTE IMMATURE GRANULOCYTE: ABNORMAL K/UL (ref 0–0.3)
ABSOLUTE LYMPH #: 1.6 K/UL (ref 1–4.8)
ABSOLUTE MONO #: 0.7 K/UL (ref 0.1–1.2)
ALBUMIN SERPL-MCNC: 4.1 G/DL (ref 3.5–5.2)
ALBUMIN/GLOBULIN RATIO: 1.2 (ref 1–2.5)
ALP BLD-CCNC: 76 U/L (ref 40–129)
ALT SERPL-CCNC: 9 U/L (ref 5–41)
ANION GAP SERPL CALCULATED.3IONS-SCNC: 12 MMOL/L (ref 9–17)
AST SERPL-CCNC: 14 U/L
BASOPHILS # BLD: 1 % (ref 0–2)
BASOPHILS ABSOLUTE: 0 K/UL (ref 0–0.2)
BILIRUB SERPL-MCNC: 0.7 MG/DL (ref 0.3–1.2)
BILIRUBIN URINE: NEGATIVE
BUN BLDV-MCNC: 34 MG/DL (ref 8–23)
BUN/CREAT BLD: ABNORMAL (ref 9–20)
CALCIUM SERPL-MCNC: 9.8 MG/DL (ref 8.6–10.4)
CHLORIDE BLD-SCNC: 98 MMOL/L (ref 98–107)
CO2: 25 MMOL/L (ref 20–31)
COLOR: YELLOW
COMMENT UA: NORMAL
CREAT SERPL-MCNC: 1.1 MG/DL (ref 0.7–1.2)
DIFFERENTIAL TYPE: ABNORMAL
EOSINOPHILS RELATIVE PERCENT: 5 % (ref 1–4)
GFR AFRICAN AMERICAN: >60 ML/MIN
GFR NON-AFRICAN AMERICAN: >60 ML/MIN
GFR SERPL CREATININE-BSD FRML MDRD: ABNORMAL ML/MIN/{1.73_M2}
GFR SERPL CREATININE-BSD FRML MDRD: ABNORMAL ML/MIN/{1.73_M2}
GLUCOSE BLD-MCNC: 202 MG/DL (ref 70–99)
GLUCOSE URINE: NEGATIVE
HCT VFR BLD CALC: 38.3 % (ref 41–53)
HEMOGLOBIN: 12.9 G/DL (ref 13.5–17.5)
IMMATURE GRANULOCYTES: ABNORMAL %
KETONES, URINE: NEGATIVE
LEUKOCYTE ESTERASE, URINE: NEGATIVE
LYMPHOCYTES # BLD: 23 % (ref 24–44)
MCH RBC QN AUTO: 32.6 PG (ref 26–34)
MCHC RBC AUTO-ENTMCNC: 33.7 G/DL (ref 31–37)
MCV RBC AUTO: 96.7 FL (ref 80–100)
MONOCYTES # BLD: 10 % (ref 2–11)
NITRITE, URINE: NEGATIVE
NRBC AUTOMATED: ABNORMAL PER 100 WBC
PDW BLD-RTO: 14.5 % (ref 12.5–15.4)
PH UA: 6.5 (ref 5–8)
PLATELET # BLD: 165 K/UL (ref 140–450)
PLATELET ESTIMATE: ABNORMAL
PMV BLD AUTO: 8.8 FL (ref 6–12)
POTASSIUM SERPL-SCNC: 4.5 MMOL/L (ref 3.7–5.3)
PROTEIN UA: NEGATIVE
RBC # BLD: 3.96 M/UL (ref 4.5–5.9)
RBC # BLD: ABNORMAL 10*6/UL
SEG NEUTROPHILS: 61 % (ref 36–66)
SEGMENTED NEUTROPHILS ABSOLUTE COUNT: 4.4 K/UL (ref 1.8–7.7)
SODIUM BLD-SCNC: 135 MMOL/L (ref 135–144)
SPECIFIC GRAVITY UA: 1.01 (ref 1–1.03)
TOTAL PROTEIN: 7.6 G/DL (ref 6.4–8.3)
TURBIDITY: CLEAR
URINE HGB: NEGATIVE
UROBILINOGEN, URINE: NORMAL
WBC # BLD: 7.1 K/UL (ref 3.5–11)
WBC # BLD: ABNORMAL 10*3/UL

## 2021-06-11 PROCEDURE — 99282 EMERGENCY DEPT VISIT SF MDM: CPT

## 2021-06-11 PROCEDURE — 36415 COLL VENOUS BLD VENIPUNCTURE: CPT

## 2021-06-11 PROCEDURE — 80053 COMPREHEN METABOLIC PANEL: CPT

## 2021-06-11 PROCEDURE — 85025 COMPLETE CBC W/AUTO DIFF WBC: CPT

## 2021-06-11 PROCEDURE — 74177 CT ABD & PELVIS W/CONTRAST: CPT

## 2021-06-11 PROCEDURE — 2580000003 HC RX 258: Performed by: EMERGENCY MEDICINE

## 2021-06-11 PROCEDURE — 81003 URINALYSIS AUTO W/O SCOPE: CPT

## 2021-06-11 PROCEDURE — 6360000004 HC RX CONTRAST MEDICATION: Performed by: EMERGENCY MEDICINE

## 2021-06-11 RX ORDER — DICYCLOMINE HYDROCHLORIDE 10 MG/1
10 CAPSULE ORAL 3 TIMES DAILY PRN
Qty: 15 CAPSULE | Refills: 0 | Status: SHIPPED | OUTPATIENT
Start: 2021-06-11 | End: 2022-11-02 | Stop reason: ALTCHOICE

## 2021-06-11 RX ORDER — SODIUM CHLORIDE 0.9 % (FLUSH) 0.9 %
5-40 SYRINGE (ML) INJECTION PRN
Status: DISCONTINUED | OUTPATIENT
Start: 2021-06-11 | End: 2021-06-11 | Stop reason: HOSPADM

## 2021-06-11 RX ORDER — 0.9 % SODIUM CHLORIDE 0.9 %
70 INTRAVENOUS SOLUTION INTRAVENOUS ONCE
Status: COMPLETED | OUTPATIENT
Start: 2021-06-11 | End: 2021-06-11

## 2021-06-11 RX ORDER — 0.9 % SODIUM CHLORIDE 0.9 %
500 INTRAVENOUS SOLUTION INTRAVENOUS ONCE
Status: COMPLETED | OUTPATIENT
Start: 2021-06-11 | End: 2021-06-11

## 2021-06-11 RX ADMIN — Medication 10 ML: at 15:08

## 2021-06-11 RX ADMIN — SODIUM CHLORIDE 70 ML: 9 INJECTION, SOLUTION INTRAVENOUS at 15:07

## 2021-06-11 RX ADMIN — IOPAMIDOL 75 ML: 755 INJECTION, SOLUTION INTRAVENOUS at 15:07

## 2021-06-11 RX ADMIN — SODIUM CHLORIDE 500 ML: 9 INJECTION, SOLUTION INTRAVENOUS at 14:29

## 2021-06-11 ASSESSMENT — ENCOUNTER SYMPTOMS
SHORTNESS OF BREATH: 0
DIARRHEA: 0
VOMITING: 0
SORE THROAT: 0

## 2021-06-11 ASSESSMENT — PAIN DESCRIPTION - FREQUENCY: FREQUENCY: CONTINUOUS

## 2021-06-11 ASSESSMENT — PAIN DESCRIPTION - LOCATION: LOCATION: ABDOMEN

## 2021-06-11 ASSESSMENT — PAIN DESCRIPTION - DESCRIPTORS: DESCRIPTORS: ACHING;DULL

## 2021-06-11 ASSESSMENT — PAIN SCALES - GENERAL: PAINLEVEL_OUTOF10: 2

## 2021-06-11 ASSESSMENT — PAIN DESCRIPTION - PAIN TYPE: TYPE: ACUTE PAIN

## 2021-06-11 NOTE — ED PROVIDER NOTES
[facility]  [facility]  Phone: [facility]  EMERGENCY DEPARTMENT ENCOUNTER      Pt Name: Higinio Muñoz MD  MRN: [id number]  [place] 1950  Date of evaluation: 6/11/2021  Provider: Quirino Duncan DO    CHIEF COMPLAINT       Chief Complaint   Patient presents with    Abdominal Pain     reports \"chronic ache\" in abdominal area          HISTORY OF PRESENT ILLNESS   (Location/Symptom, Timing/Onset,Context/Setting, Quality, Duration, Modifying Factors, Severity)  Note limiting factors. 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 Patient reports that he has had a chronic ache in his abdomen for about 10 days. Patient reports that this has been associated with an elevation in his blood pressure which is abnormal for him. He is on multiple medications to control his blood pressure. States that in the past these were successful. He does have a history of a 3 cm abdominal aortic aneurysm he is concerned about complication with this. Patient denies chest pain or shortness of breath, denies any other acute complaints at this time. He has no dysuria but he has had some increased urinary frequency and he has a history of benign prostatic hyperplasia as well as prostatitis    Nursing Notes were reviewed. REVIEW OF SYSTEMS    (2-9systems for level 4, 10 or more for level 5)     Review of Systems   Constitutional: Negative for fever. HENT: Negative for sore throat. Respiratory: Negative for shortness of breath. Cardiovascular: Negative for chest pain. Gastrointestinal: Negative for diarrhea and vomiting. Genitourinary: Positive for frequency. Negative for dysuria. Skin: Negative for rash. Neurological: Negative for weakness. All other systems reviewed and are negative.       Except asnoted above the remainder of the review of systems was reviewed and negative. PAST MEDICAL HISTORY     Past Medical History:   Diagnosis Date    BPH (benign prostatic hyperplasia)     CAD (coronary artery disease)     GERD (gastroesophageal reflux disease)     Hyperlipidemia     Hypertension     Osteoarthritis     Thrush 2015    Type II or unspecified type diabetes mellitus without mention of complication, not stated as uncontrolled          SURGICAL HISTORY       Past Surgical History:   Procedure Laterality Date    CARPAL TUNNEL RELEASE      bilateral    FINGER TRIGGER RELEASE      right thumb    HERNIA REPAIR      right inguinal hernia    TONSILLECTOMY AND ADENOIDECTOMY           CURRENT MEDICATIONS     Previous Medications    ASPIRIN 81 MG TABLET    Take 81 mg by mouth daily    ATORVASTATIN (LIPITOR) 80 MG TABLET    Take 1 tablet by mouth nightly.     CARVEDILOL (COREG) 25 MG TABLET    Take 1 tablet by mouth 2 times daily    CELECOXIB (CELEBREX) 100 MG CAPSULE    Take 100 mg by mouth 2 times daily    CETIRIZINE (ZYRTEC) 10 MG TABLET    Take 10 mg by mouth daily    CHLORTHALIDONE (HYGROTON) 25 MG TABLET    Take 25 mg by mouth daily    CLOPIDOGREL (PLAVIX) 75 MG TABLET    Take 75 mg by mouth daily    FAMOTIDINE (PEPCID) 20 MG TABLET    Take 1 tablet by mouth 2 times daily    FOLIC ACID (FOLVITE) 252 MCG TABLET    Take 800 mcg by mouth 3 times daily    GLIMEPIRIDE (AMARYL) 4 MG TABLET    Take 1 tablet by mouth every morning (before breakfast)    INSULIN GLARGINE (LANTUS) 100 UNIT/ML INJECTION VIAL    Inject 30 Units into the skin nightly    ISOSORBIDE MONONITRATE (IMDUR) 30 MG EXTENDED RELEASE TABLET    Take 1 tablet by mouth daily    MAGNESIUM OXIDE (MAG-OX) 400 MG TABLET    Take 400 mg by mouth 2 times daily    MULTIPLE VITAMINS-MINERALS (THERAPEUTIC MULTIVITAMIN-MINERALS) TABLET    Take 1 tablet by mouth daily    NIACIN (SLO-NIACIN) 500 MG TABLET    Take 2,000 mg by mouth nightly    OMEPRAZOLE (PRILOSEC) 20 MG CAPSULE    Take 40 mg by mouth daily SPIRONOLACTONE (ALDACTONE) 50 MG TABLET    Take 2 tablets by mouth daily    TAMSULOSIN (FLOMAX) 0.4 MG CAPSULE    Take 0.4 mg by mouth 2 times daily    VITAMIN B-12 (CYANOCOBALAMIN) 1000 MCG TABLET    Take 1,500 mcg by mouth daily       ALLERGIES     Ace inhibitors, Diovan [valsartan], Januvia [sitagliptin], Lisinopril, Peanuts [peanut oil], and Tekturna [aliskiren]    FAMILY HISTORY       Family History   Problem Relation Age of Onset    Heart Disease Mother     Other Mother     Heart Disease Father     Heart Disease Paternal Uncle     Heart Disease Maternal Grandmother     Diabetes Paternal Grandmother     Cancer Paternal Grandfather           SOCIAL HISTORY       Social History     Socioeconomic History    Marital status:      Spouse name: None    Number of children: None    Years of education: None    Highest education level: None   Occupational History    None   Tobacco Use    Smoking status: Never Smoker    Smokeless tobacco: Never Used   Substance and Sexual Activity    Alcohol use: No     Alcohol/week: 0.0 standard drinks    Drug use: No    Sexual activity: Never   Other Topics Concern    None   Social History Narrative    None     Social Determinants of Health     Financial Resource Strain:     Difficulty of Paying Living Expenses:    Food Insecurity:     Worried About Running Out of Food in the Last Year:     Ran Out of Food in the Last Year:    Transportation Needs:     Lack of Transportation (Medical):      Lack of Transportation (Non-Medical):    Physical Activity:     Days of Exercise per Week:     Minutes of Exercise per Session:    Stress:     Feeling of Stress :    Social Connections:     Frequency of Communication with Friends and Family:     Frequency of Social Gatherings with Friends and Family:     Attends Alevism Services:     Active Member of Clubs or Organizations:     Attends Club or Organization Meetings:     Marital Status:    Intimate Partner (!) 171/84   Pulse: 62   Resp: 18   SpO2: 98%   Weight: 81.4 kg (179 lb 8 oz)       Patient presents to the emergency department with a complaint described above. Vital signs show hypertension, otherwise unremarkable. Physical examination did not reveal any significant abnormalities or obvious deficits. At this time, based on his history I do think blood work including urinalysis, CT of the abdomen pelvis with IV contrast are indicated and I will reevaluate      DIAGNOSTIC RESULTS     LABS:  Labs Reviewed   CBC WITH AUTO DIFFERENTIAL - Abnormal; Notable for the following components:       Result Value    RBC 3.96 (*)     Hemoglobin 12.9 (*)     Hematocrit 38.3 (*)     Lymphocytes 23 (*)     Eosinophils % 5 (*)     All other components within normal limits   COMPREHENSIVE METABOLIC PANEL - Abnormal; Notable for the following components:    Glucose 202 (*)     BUN 34 (*)     All other components within normal limits   URINE RT REFLEX TO CULTURE       All other labs were within normal range or not returned as of this dictation. RADIOLOGY:  CT ABDOMEN PELVIS W IV CONTRAST Additional Contrast? None   Preliminary Result   1. No acute finding to account for patient's left lower quadrant abdominal   pain. Extensive sigmoid diverticulosis without evidence of diverticulitis. 2. 3.9 cm cystic collection along the posterior right lateral margin of the   urinary bladder. Differential includes bladder diverticulum. If patient has   had prior surgery, differential would also include seroma and lymphocele. 3. Diminutive left kidney with minimal delayed nephrogram suggestive of   diminished renal function.                ED Course as of Jun 11 1608 Fri Jun 11, 2021   1556 CBC is grossly unremarkable, mild anemia noted, CMP shows mild elevation in BUN to creatinine ratio, UA unremarkable, CT scan generally unremarkable    [TS]   1559 At this time, I am going to prescribe Bentyl and I am going to recommend following up

## 2022-10-05 ENCOUNTER — OFFICE VISIT (OUTPATIENT)
Dept: ORTHOPEDIC SURGERY | Age: 72
End: 2022-10-05
Payer: MEDICARE

## 2022-10-05 VITALS — WEIGHT: 182 LBS | HEIGHT: 70 IN | RESPIRATION RATE: 16 BRPM | BODY MASS INDEX: 26.05 KG/M2

## 2022-10-05 DIAGNOSIS — M25.512 LEFT SHOULDER PAIN, UNSPECIFIED CHRONICITY: Primary | ICD-10-CM

## 2022-10-05 PROCEDURE — G8427 DOCREV CUR MEDS BY ELIG CLIN: HCPCS | Performed by: ORTHOPAEDIC SURGERY

## 2022-10-05 PROCEDURE — 99203 OFFICE O/P NEW LOW 30 MIN: CPT | Performed by: ORTHOPAEDIC SURGERY

## 2022-10-05 PROCEDURE — 3017F COLORECTAL CA SCREEN DOC REV: CPT | Performed by: ORTHOPAEDIC SURGERY

## 2022-10-05 PROCEDURE — 1036F TOBACCO NON-USER: CPT | Performed by: ORTHOPAEDIC SURGERY

## 2022-10-05 PROCEDURE — G8484 FLU IMMUNIZE NO ADMIN: HCPCS | Performed by: ORTHOPAEDIC SURGERY

## 2022-10-05 PROCEDURE — 1123F ACP DISCUSS/DSCN MKR DOCD: CPT | Performed by: ORTHOPAEDIC SURGERY

## 2022-10-05 PROCEDURE — G8419 CALC BMI OUT NRM PARAM NOF/U: HCPCS | Performed by: ORTHOPAEDIC SURGERY

## 2022-10-05 RX ORDER — HYDRALAZINE HYDROCHLORIDE 25 MG/1
25 TABLET, FILM COATED ORAL 4 TIMES DAILY
COMMUNITY

## 2022-10-05 NOTE — PROGRESS NOTES
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 He indicates that he has been having pain in his shoulder for about a year but it got worse about 4 to 6 weeks ago. It was so bad that he could barely lift up his arm. About a week ago he was lifting up garbage bin with both arms and felt a pop in the left shoulder. After the pop he experienced improvement in his range of motion and even though the shoulder was tender for a few days his pain has gotten better and now is back to his baseline. Previous treatment:    NSAIDs: Celebrex    Physical Therapy: No    Injections: Right shoulder cortisone injection in the past from Dr. Renate Raza    Surgeries: None    Review of Systems:   Constitutional: Negative for fever, chills, sweats. Pain Score:   1  Neurological: Negative for headache, numbness, or weakness. Musculoskeletal: As noted in HPI     Past Medical History  Collin Snow  has a past medical history of BPH (benign prostatic hyperplasia), CAD (coronary artery disease), GERD (gastroesophageal reflux disease), Hyperlipidemia, Hypertension, Osteoarthritis, Thrush, and Type II or unspecified type diabetes mellitus without mention of complication, not stated as uncontrolled. Past Surgical History  Collin Snow  has a past surgical history that includes hernia repair; Carpal tunnel release; Finger trigger release; and Tonsillectomy and adenoidectomy.     Current Medications  Current Outpatient Medications   Medication Sig Dispense Refill    hydrALAZINE (APRESOLINE) 25 MG tablet Take 25 mg by mouth 4 times daily      famotidine (PEPCID) 20 MG tablet Take 1 tablet by mouth 2 times daily  3    chlorthalidone (HYGROTON) 25 MG tablet Take 25 mg by mouth daily      spironolactone (ALDACTONE) 50 MG tablet Take 2 tablets by mouth daily 90 tablet 3    glimepiride (AMARYL) 4 MG tablet Take 1 tablet by mouth every morning (before breakfast) 90 tablet 3    isosorbide mononitrate (IMDUR) 30 MG extended release tablet Take 1 tablet by mouth daily 90 tablet 3    carvedilol (COREG) 25 MG tablet Take 1 tablet by mouth 2 times daily 180 tablet 3    magnesium oxide (MAG-OX) 400 MG tablet Take 400 mg by mouth 2 times daily      aspirin 81 MG tablet Take 81 mg by mouth daily      celecoxib (CELEBREX) 100 MG capsule Take 100 mg by mouth 2 times daily      omeprazole (PRILOSEC) 20 MG capsule Take 40 mg by mouth daily      tamsulosin (FLOMAX) 0.4 MG capsule Take 0.4 mg by mouth 2 times daily      clopidogrel (PLAVIX) 75 MG tablet Take 75 mg by mouth daily      folic acid (FOLVITE) 933 MCG tablet Take 800 mcg by mouth 3 times daily      vitamin B-12 (CYANOCOBALAMIN) 1000 MCG tablet Take 1,500 mcg by mouth daily      niacin (SLO-NIACIN) 500 MG tablet Take 2,000 mg by mouth nightly      Multiple Vitamins-Minerals (THERAPEUTIC MULTIVITAMIN-MINERALS) tablet Take 1 tablet by mouth daily      cetirizine (ZYRTEC) 10 MG tablet Take 10 mg by mouth daily      atorvastatin (LIPITOR) 80 MG tablet Take 1 tablet by mouth nightly. 30 tablet 11    dicyclomine (BENTYL) 10 MG capsule Take 1 capsule by mouth 3 times daily as needed (stomach cramps) 15 capsule 0    insulin glargine (LANTUS) 100 UNIT/ML injection vial Inject 30 Units into the skin nightly       No current facility-administered medications for this visit. Allergies  Allergies have been reviewed. Richard Vega is allergic to ace inhibitors, diovan [valsartan], januvia [sitagliptin], lisinopril, peanuts [peanut oil], and tekturna [aliskiren]. Social History  Richard Vega  reports that he has never smoked. He has never used smokeless tobacco. He reports that he does not drink alcohol and does not use drugs.     Family History  Kilo's family history includes Cancer in his paternal grandfather; Diabetes in his paternal grandmother; Heart Disease in his father, maternal grandmother, mother, and paternal uncle; Other in his mother. Physical Exam:     Resp 16   Ht 5' 10\" (1.778 m)   Wt 182 lb (82.6 kg)   BMI 26.11 kg/m²    Constitutional: Patient is oriented to person, place, and time. Patient appears well-developed and well nourished. Mental Status/psychiatric: Behavior is normal. Thought content normal.  HENT: Negative otherwise noted  Head: Normocephalic and Atraumatic  Nose: Normal  Respiratory/Cardio: Effort normal. No respiratory distress. Shoulder:    Skin: Skin is warm and dry; no swelling or obvious muscular atrophy.    Vasculature: 2+ radial pulses bilaterally  Neuro: Sensation grossly intact to light touch diffusely  Tenderness: Mildly tender to palpation over the anterior aspect of the left shoulder    ROM: (Degrees)    Right   A P   Left   A P    Elevation  140    Elevation  130 130  Abduction  150    Abduction  135  135  ER   70    ER   65 75  IR   T12    IR   T12   90 abd/ER      90 abd/ER     90 abd/IR      90 abd/IR     Crepitation  No    Crepitation No  Dyskenesia  No    Dyskenesia No      Muscle strength:    Right       Left    Deltoid   5    Deltoid   5  Supraspinatus  5    Supraspinatus  5  ER   5    ER   5  IR   5    IR   5    Special tests    Right   Rotator Cuff    Left    n   Painful arc    y   n   Pain with ER    n    n   Neer's     n    n   Hawkin's    y    n   Drop Arm    n  n   Lift off/Belly Press   n  n   ER Lag    n          AC Joint  n   AC tenderness   n  n   Cross-chest adduction  n       Labrum/biceps    n   Admire's    n   n   Biceps sheer    n      n   Speed's/Yergason's   y    n   Tenderness Biceps Groove  y    n   Sai's    n         Instability  n   Ant Apprehension   n    n   Post Apprehension   n    n   Ant Load shift    n    n   Post Load shift   n   n   Sulcus     n  n   Generalized Laxity   n  n   Relocation test   n  n   Crank test     n  n   Otf-superior escape  n       Imaging:  Xrays: 4 views of the left shoulder obtained on 10/5/2022 were independently reviewed  Indications: Left shoulder pain  Findings: Mild to moderate narrowing of the glenohumeral joint space. Acromioclavicular joint space not adequately visualized. No obvious fracture, dislocation or subluxation. 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 We had a discussion about possible etiologies. He has some mild glenohumeral joint osteoarthritis as outlined above. We discussed this as a potential source of pain as well as a rotator cuff issue as well. At this point he is back to his baseline and states that his shoulder is really not that painful or bothersome and so I recommended continued symptomatic care. Should he have a flare up I would recommend evaluation at that time and we discussed the possibility of a cortisone injection if necessary. This note is created with the assistance of a speech recognition program.  While intending to generate adocument that actually reflects the content of the visit, the document can still have some errors including those of syntax and sound a like substitutions which may escape proof reading. It such instances, actual meaningcan be extrapolated by contextual diversion.     NA = Not assessed  RTC = Rotator cuff  RCT = Rotator cuff tear  ER = External rotation  IR = Internal rotation  AC = Acromioclavicular  GH = Glenohumeral  n = No  y = Yes

## 2022-10-20 ENCOUNTER — HOSPITAL ENCOUNTER (OUTPATIENT)
Facility: CLINIC | Age: 72
Discharge: HOME | End: 2022-10-20
Payer: MEDICARE

## 2022-10-20 LAB
ALBUMIN SERPL-MCNC: 4.4 G/DL (ref 3.5–5.2)
ALBUMIN/GLOBULIN RATIO: 1.4 (ref 1–2.5)
ALP BLD-CCNC: 62 U/L (ref 40–129)
ALT SERPL-CCNC: 12 U/L (ref 5–41)
ANION GAP SERPL CALCULATED.3IONS-SCNC: 12 MMOL/L (ref 9–17)
AST SERPL-CCNC: 17 U/L
BILIRUB SERPL-MCNC: 0.3 MG/DL (ref 0.3–1.2)
BUN BLDV-MCNC: 39 MG/DL (ref 8–23)
CALCIUM SERPL-MCNC: 10.1 MG/DL (ref 8.6–10.4)
CHLORIDE BLD-SCNC: 101 MMOL/L (ref 98–107)
CHOLESTEROL/HDL RATIO: 2.3
CHOLESTEROL: 139 MG/DL
CO2: 26 MMOL/L (ref 20–31)
CREAT SERPL-MCNC: 1.56 MG/DL (ref 0.7–1.2)
ESTIMATED AVERAGE GLUCOSE: 197 MG/DL
GFR SERPL CREATININE-BSD FRML MDRD: 47 ML/MIN/1.73M2
GLUCOSE BLD-MCNC: 208 MG/DL (ref 70–99)
HBA1C MFR BLD: 8.5 % (ref 4–6)
HCT VFR BLD CALC: 40 % (ref 40.7–50.3)
HDLC SERPL-MCNC: 61 MG/DL
HEMOGLOBIN: 13.6 G/DL (ref 13–17)
LDL CHOLESTEROL: 58 MG/DL (ref 0–130)
MAGNESIUM: 1.8 MG/DL (ref 1.6–2.6)
MCH RBC QN AUTO: 33.3 PG (ref 25.2–33.5)
MCHC RBC AUTO-ENTMCNC: 34 G/DL (ref 28.4–34.8)
MCV RBC AUTO: 97.8 FL (ref 82.6–102.9)
NRBC AUTOMATED: 0 PER 100 WBC
PDW BLD-RTO: 12.9 % (ref 11.8–14.4)
PLATELET # BLD: 154 K/UL (ref 138–453)
PMV BLD AUTO: 11.7 FL (ref 8.1–13.5)
POTASSIUM SERPL-SCNC: 4.8 MMOL/L (ref 3.7–5.3)
PROSTATE SPECIFIC ANTIGEN: 6.91 NG/ML
RBC # BLD: 4.09 M/UL (ref 4.21–5.77)
SODIUM BLD-SCNC: 139 MMOL/L (ref 135–144)
TOTAL PROTEIN: 7.5 G/DL (ref 6.4–8.3)
TRIGL SERPL-MCNC: 102 MG/DL
TSH SERPL DL<=0.05 MIU/L-ACNC: 2.02 UIU/ML (ref 0.3–5)
WBC # BLD: 8 K/UL (ref 3.5–11.3)

## 2022-10-20 PROCEDURE — 84436 ASSAY OF TOTAL THYROXINE: CPT

## 2022-10-20 PROCEDURE — 80053 COMPREHEN METABOLIC PANEL: CPT

## 2022-10-20 PROCEDURE — 80061 LIPID PANEL: CPT

## 2022-10-20 PROCEDURE — 83735 ASSAY OF MAGNESIUM: CPT

## 2022-10-20 PROCEDURE — 36415 COLL VENOUS BLD VENIPUNCTURE: CPT

## 2022-10-20 PROCEDURE — 84154 ASSAY OF PSA FREE: CPT

## 2022-10-20 PROCEDURE — 85027 COMPLETE CBC AUTOMATED: CPT

## 2022-10-20 PROCEDURE — 84153 ASSAY OF PSA TOTAL: CPT

## 2022-10-20 PROCEDURE — 84443 ASSAY THYROID STIM HORMONE: CPT

## 2022-10-20 PROCEDURE — 83036 HEMOGLOBIN GLYCOSYLATED A1C: CPT

## 2022-10-21 LAB
PROSTATE SPECIFIC ANTIGEN FREE: 2.3 UG/L
PROSTATE SPECIFIC ANTIGEN PERCENT FREE: 38.3 %
PROSTATE SPECIFIC ANTIGEN: 6 UG/L (ref 0–4)
T4 TOTAL: 5.4 UG/DL (ref 4.5–10.9)

## 2022-10-27 ENCOUNTER — HOSPITAL ENCOUNTER (OUTPATIENT)
Facility: CLINIC | Age: 72
Discharge: HOME | End: 2022-10-27
Payer: MEDICARE

## 2022-10-27 LAB
ANION GAP SERPL CALCULATED.3IONS-SCNC: 13 MMOL/L (ref 9–17)
BUN BLDV-MCNC: 36 MG/DL (ref 8–23)
CALCIUM SERPL-MCNC: 9.5 MG/DL (ref 8.6–10.4)
CHLORIDE BLD-SCNC: 95 MMOL/L (ref 98–107)
CO2: 27 MMOL/L (ref 20–31)
CREAT SERPL-MCNC: 1.53 MG/DL (ref 0.7–1.2)
GFR SERPL CREATININE-BSD FRML MDRD: 48 ML/MIN/1.73M2
GLUCOSE BLD-MCNC: 160 MG/DL (ref 70–99)
POTASSIUM SERPL-SCNC: 3.9 MMOL/L (ref 3.7–5.3)
SODIUM BLD-SCNC: 135 MMOL/L (ref 135–144)

## 2022-10-27 PROCEDURE — 80048 BASIC METABOLIC PNL TOTAL CA: CPT

## 2022-10-27 PROCEDURE — 36415 COLL VENOUS BLD VENIPUNCTURE: CPT

## 2022-11-10 ENCOUNTER — HOSPITAL ENCOUNTER (OUTPATIENT)
Dept: ULTRASOUND IMAGING | Facility: CLINIC | Age: 72
Discharge: HOME | End: 2022-11-12
Payer: MEDICARE

## 2022-11-10 DIAGNOSIS — N18.31 STAGE 3A CHRONIC KIDNEY DISEASE (HCC): ICD-10-CM

## 2022-11-10 DIAGNOSIS — I10 ESSENTIAL HYPERTENSION: ICD-10-CM

## 2022-11-10 DIAGNOSIS — Z86.39 HISTORY OF DIABETES MELLITUS: ICD-10-CM

## 2022-11-10 DIAGNOSIS — Z83.438 FAMILY HISTORY OF DYSLIPIDEMIA: ICD-10-CM

## 2022-11-10 DIAGNOSIS — I71.43 INFRARENAL ABDOMINAL AORTIC ANEURYSM (AAA) WITHOUT RUPTURE: ICD-10-CM

## 2022-11-10 PROCEDURE — 76770 US EXAM ABDO BACK WALL COMP: CPT

## 2022-11-11 ENCOUNTER — HOSPITAL ENCOUNTER (OUTPATIENT)
Facility: CLINIC | Age: 72
Discharge: HOME | End: 2022-11-11
Payer: MEDICARE

## 2022-11-11 DIAGNOSIS — N18.31 STAGE 3A CHRONIC KIDNEY DISEASE (HCC): ICD-10-CM

## 2022-11-11 DIAGNOSIS — Z86.39 HISTORY OF DIABETES MELLITUS: ICD-10-CM

## 2022-11-11 DIAGNOSIS — I10 ESSENTIAL HYPERTENSION: ICD-10-CM

## 2022-11-11 DIAGNOSIS — I71.43 INFRARENAL ABDOMINAL AORTIC ANEURYSM (AAA) WITHOUT RUPTURE: ICD-10-CM

## 2022-11-11 LAB
ABSOLUTE EOS #: 0.51 K/UL (ref 0–0.44)
ABSOLUTE IMMATURE GRANULOCYTE: <0.03 K/UL (ref 0–0.3)
ABSOLUTE LYMPH #: 1.55 K/UL (ref 1.1–3.7)
ABSOLUTE MONO #: 0.72 K/UL (ref 0.1–1.2)
ANION GAP SERPL CALCULATED.3IONS-SCNC: 10 MMOL/L (ref 9–17)
BASOPHILS # BLD: 1 % (ref 0–2)
BASOPHILS ABSOLUTE: 0.05 K/UL (ref 0–0.2)
BILIRUBIN URINE: NEGATIVE
BUN BLDV-MCNC: 32 MG/DL (ref 8–23)
CALCIUM SERPL-MCNC: 9.5 MG/DL (ref 8.6–10.4)
CHLORIDE BLD-SCNC: 102 MMOL/L (ref 98–107)
CO2: 28 MMOL/L (ref 20–31)
COLOR: YELLOW
COMMENT UA: ABNORMAL
COMPLEMENT C3: 120 MG/DL (ref 90–180)
COMPLEMENT C4: 26 MG/DL (ref 10–40)
CREAT SERPL-MCNC: 1.48 MG/DL (ref 0.7–1.2)
CREATININE URINE: 39.5 MG/DL (ref 39–259)
EOSINOPHIL,URINE: NORMAL
EOSINOPHILS RELATIVE PERCENT: 7 % (ref 1–4)
FREE KAPPA/LAMBDA RATIO: 12.17 (ref 0.26–1.65)
GFR SERPL CREATININE-BSD FRML MDRD: 49 ML/MIN/1.73M2
GLUCOSE BLD-MCNC: 147 MG/DL (ref 70–99)
GLUCOSE URINE: ABNORMAL
HCT VFR BLD CALC: 41.5 % (ref 40.7–50.3)
HEMOGLOBIN: 14.2 G/DL (ref 13–17)
IMMATURE GRANULOCYTES: 0 %
KAPPA FREE LIGHT CHAINS QNT: 16.43 MG/DL (ref 0.37–1.94)
KETONES, URINE: NEGATIVE
LAMBDA FREE LIGHT CHAINS QNT: 1.35 MG/DL (ref 0.57–2.63)
LEUKOCYTE ESTERASE, URINE: NEGATIVE
LYMPHOCYTES # BLD: 23 % (ref 24–43)
MCH RBC QN AUTO: 33 PG (ref 25.2–33.5)
MCHC RBC AUTO-ENTMCNC: 34.2 G/DL (ref 28.4–34.8)
MCV RBC AUTO: 96.5 FL (ref 82.6–102.9)
MICROALBUMIN/CREAT 24H UR: <12 MG/L
MICROALBUMIN/CREAT UR-RTO: NORMAL MCG/MG CREAT
MONOCYTES # BLD: 11 % (ref 3–12)
NITRITE, URINE: NEGATIVE
NRBC AUTOMATED: 0 PER 100 WBC
PDW BLD-RTO: 12.9 % (ref 11.8–14.4)
PH UA: 6.5 (ref 5–8)
PLATELET # BLD: 170 K/UL (ref 138–453)
PMV BLD AUTO: 11.3 FL (ref 8.1–13.5)
POTASSIUM SERPL-SCNC: 4.4 MMOL/L (ref 3.7–5.3)
PROTEIN UA: NEGATIVE
RBC # BLD: 4.3 M/UL (ref 4.21–5.77)
SEG NEUTROPHILS: 58 % (ref 36–65)
SEGMENTED NEUTROPHILS ABSOLUTE COUNT: 4.03 K/UL (ref 1.5–8.1)
SODIUM BLD-SCNC: 140 MMOL/L (ref 135–144)
SPECIFIC GRAVITY UA: 1.02 (ref 1–1.03)
TURBIDITY: CLEAR
URINE HGB: NEGATIVE
UROBILINOGEN, URINE: NORMAL
WBC # BLD: 6.9 K/UL (ref 3.5–11.3)

## 2022-11-11 PROCEDURE — 81003 URINALYSIS AUTO W/O SCOPE: CPT

## 2022-11-11 PROCEDURE — 84165 PROTEIN E-PHORESIS SERUM: CPT

## 2022-11-11 PROCEDURE — 85025 COMPLETE CBC W/AUTO DIFF WBC: CPT

## 2022-11-11 PROCEDURE — 86334 IMMUNOFIX E-PHORESIS SERUM: CPT

## 2022-11-11 PROCEDURE — 87205 SMEAR GRAM STAIN: CPT

## 2022-11-11 PROCEDURE — 86225 DNA ANTIBODY NATIVE: CPT

## 2022-11-11 PROCEDURE — 86038 ANTINUCLEAR ANTIBODIES: CPT

## 2022-11-11 PROCEDURE — 84155 ASSAY OF PROTEIN SERUM: CPT

## 2022-11-11 PROCEDURE — 86160 COMPLEMENT ANTIGEN: CPT

## 2022-11-11 PROCEDURE — 82043 UR ALBUMIN QUANTITATIVE: CPT

## 2022-11-11 PROCEDURE — 82570 ASSAY OF URINE CREATININE: CPT

## 2022-11-11 PROCEDURE — 80048 BASIC METABOLIC PNL TOTAL CA: CPT

## 2022-11-11 PROCEDURE — 36415 COLL VENOUS BLD VENIPUNCTURE: CPT

## 2022-11-11 PROCEDURE — 83521 IG LIGHT CHAINS FREE EACH: CPT

## 2022-11-12 LAB
ANTI DNA DOUBLE STRANDED: 1.5 IU/ML
ANTI-NUCLEAR ANTIBODY (ANA): NEGATIVE
ENA ANTIBODIES SCREEN: 0.2 U/ML

## 2022-11-14 LAB
ALBUMIN (CALCULATED): 4.5 G/DL (ref 3.2–5.2)
ALBUMIN PERCENT: 65 % (ref 45–65)
ALPHA 1 PERCENT: 2 % (ref 3–6)
ALPHA 2 PERCENT: 9 % (ref 6–13)
ALPHA-1-GLOBULIN: 0.2 G/DL (ref 0.1–0.4)
ALPHA-2-GLOBULIN: 0.6 G/DL (ref 0.5–0.9)
BETA GLOBULIN: 0.6 G/DL (ref 0.5–1.1)
BETA PERCENT: 8 % (ref 11–19)
GAMMA GLOBULIN %: 15 % (ref 9–20)
GAMMA GLOBULIN: 1 G/DL (ref 0.5–1.5)
PATHOLOGIST: ABNORMAL
PATHOLOGIST: NORMAL
PROTEIN ELECTROPHORESIS, SERUM: ABNORMAL
SERUM IFX INTERP: NORMAL
TOTAL PROT. SUM,%: 99 % (ref 98–102)
TOTAL PROT. SUM: 6.9 G/DL (ref 6.3–8.2)
TOTAL PROTEIN: 6.9 G/DL (ref 6.4–8.3)

## 2022-11-22 ENCOUNTER — HOSPITAL ENCOUNTER (OUTPATIENT)
Facility: CLINIC | Age: 72
Discharge: HOME | End: 2022-11-22
Payer: MEDICARE

## 2022-11-22 DIAGNOSIS — R76.8 ELEVATED SERUM IMMUNOGLOBULIN FREE LIGHT CHAINS: ICD-10-CM

## 2022-11-22 LAB
IGA: 128 MG/DL (ref 70–400)
IGG: 1414 MG/DL (ref 700–1600)
IGM: 74 MG/DL (ref 40–230)

## 2022-11-22 PROCEDURE — 82784 ASSAY IGA/IGD/IGG/IGM EACH: CPT

## 2022-11-22 PROCEDURE — 36415 COLL VENOUS BLD VENIPUNCTURE: CPT

## 2022-12-05 ENCOUNTER — HOSPITAL ENCOUNTER (OUTPATIENT)
Facility: CLINIC | Age: 72
Discharge: HOME | End: 2022-12-05
Payer: MEDICARE

## 2022-12-05 LAB
ANION GAP SERPL CALCULATED.3IONS-SCNC: 17 MMOL/L (ref 9–17)
BUN BLDV-MCNC: 39 MG/DL (ref 8–23)
CALCIUM SERPL-MCNC: 9.9 MG/DL (ref 8.6–10.4)
CHLORIDE BLD-SCNC: 101 MMOL/L (ref 98–107)
CO2: 23 MMOL/L (ref 20–31)
CREAT SERPL-MCNC: 1.66 MG/DL (ref 0.7–1.2)
GFR SERPL CREATININE-BSD FRML MDRD: 43 ML/MIN/1.73M2
GLUCOSE BLD-MCNC: 196 MG/DL (ref 70–99)
MAGNESIUM: 1.8 MG/DL (ref 1.6–2.6)
POTASSIUM SERPL-SCNC: 4.6 MMOL/L (ref 3.7–5.3)
SODIUM BLD-SCNC: 141 MMOL/L (ref 135–144)

## 2022-12-05 PROCEDURE — 80048 BASIC METABOLIC PNL TOTAL CA: CPT

## 2022-12-05 PROCEDURE — 83735 ASSAY OF MAGNESIUM: CPT

## 2022-12-05 PROCEDURE — 36415 COLL VENOUS BLD VENIPUNCTURE: CPT

## 2023-01-18 ENCOUNTER — HOSPITAL ENCOUNTER (OUTPATIENT)
Dept: GENERAL RADIOLOGY | Facility: CLINIC | Age: 73
Discharge: HOME | End: 2023-01-20
Payer: COMMERCIAL

## 2023-01-18 ENCOUNTER — HOSPITAL ENCOUNTER (OUTPATIENT)
Facility: CLINIC | Age: 73
Discharge: HOME | End: 2023-01-18
Payer: COMMERCIAL

## 2023-01-18 ENCOUNTER — HOSPITAL ENCOUNTER (OUTPATIENT)
Facility: CLINIC | Age: 73
Discharge: HOME | End: 2023-01-20
Payer: COMMERCIAL

## 2023-01-18 DIAGNOSIS — D47.2 MONOCLONAL GAMMOPATHY: ICD-10-CM

## 2023-01-18 LAB
ABSOLUTE EOS #: 0.42 K/UL (ref 0–0.44)
ABSOLUTE IMMATURE GRANULOCYTE: <0.03 K/UL (ref 0–0.3)
ABSOLUTE LYMPH #: 1.87 K/UL (ref 1.1–3.7)
ABSOLUTE MONO #: 0.85 K/UL (ref 0.1–1.2)
BASOPHILS # BLD: 1 % (ref 0–2)
BASOPHILS ABSOLUTE: 0.05 K/UL (ref 0–0.2)
EOSINOPHILS RELATIVE PERCENT: 6 % (ref 1–4)
HCT VFR BLD CALC: 43.2 % (ref 40.7–50.3)
HEMOGLOBIN: 14.4 G/DL (ref 13–17)
IMMATURE GRANULOCYTES: 0 %
LYMPHOCYTES # BLD: 24 % (ref 24–43)
MCH RBC QN AUTO: 33 PG (ref 25.2–33.5)
MCHC RBC AUTO-ENTMCNC: 33.3 G/DL (ref 28.4–34.8)
MCV RBC AUTO: 98.9 FL (ref 82.6–102.9)
MONOCYTES # BLD: 11 % (ref 3–12)
NRBC AUTOMATED: 0 PER 100 WBC
PDW BLD-RTO: 12.6 % (ref 11.8–14.4)
PLATELET # BLD: 164 K/UL (ref 138–453)
PMV BLD AUTO: 11.4 FL (ref 8.1–13.5)
RBC # BLD: 4.37 M/UL (ref 4.21–5.77)
SEG NEUTROPHILS: 58 % (ref 36–65)
SEGMENTED NEUTROPHILS ABSOLUTE COUNT: 4.45 K/UL (ref 1.5–8.1)
WBC # BLD: 7.7 K/UL (ref 3.5–11.3)

## 2023-01-18 PROCEDURE — 82784 ASSAY IGA/IGD/IGG/IGM EACH: CPT

## 2023-01-18 PROCEDURE — 84156 ASSAY OF PROTEIN URINE: CPT

## 2023-01-18 PROCEDURE — 36415 COLL VENOUS BLD VENIPUNCTURE: CPT

## 2023-01-18 PROCEDURE — 80053 COMPREHEN METABOLIC PANEL: CPT

## 2023-01-18 PROCEDURE — 82232 ASSAY OF BETA-2 PROTEIN: CPT

## 2023-01-18 PROCEDURE — 83521 IG LIGHT CHAINS FREE EACH: CPT

## 2023-01-18 PROCEDURE — 77075 RADEX OSSEOUS SURVEY COMPL: CPT

## 2023-01-18 PROCEDURE — 86334 IMMUNOFIX E-PHORESIS SERUM: CPT

## 2023-01-18 PROCEDURE — 84166 PROTEIN E-PHORESIS/URINE/CSF: CPT

## 2023-01-18 PROCEDURE — 84155 ASSAY OF PROTEIN SERUM: CPT

## 2023-01-18 PROCEDURE — 84165 PROTEIN E-PHORESIS SERUM: CPT

## 2023-01-18 PROCEDURE — 86335 IMMUNFIX E-PHORSIS/URINE/CSF: CPT

## 2023-01-18 PROCEDURE — 85025 COMPLETE CBC W/AUTO DIFF WBC: CPT

## 2023-01-19 LAB
ALBUMIN (CALCULATED): 4.5 G/DL (ref 3.2–5.2)
ALBUMIN PERCENT: 62 % (ref 45–65)
ALBUMIN SERPL-MCNC: 4.2 G/DL (ref 3.5–5.2)
ALBUMIN/GLOBULIN RATIO: 1.2 (ref 1–2.5)
ALP BLD-CCNC: 61 U/L (ref 40–129)
ALPHA 1 PERCENT: 2 % (ref 3–6)
ALPHA 2 PERCENT: 10 % (ref 6–13)
ALPHA-1-GLOBULIN: 0.2 G/DL (ref 0.1–0.4)
ALPHA-2-GLOBULIN: 0.7 G/DL (ref 0.5–0.9)
ALT SERPL-CCNC: 13 U/L (ref 5–41)
ANION GAP SERPL CALCULATED.3IONS-SCNC: 13 MMOL/L (ref 9–17)
AST SERPL-CCNC: 16 U/L
BETA GLOBULIN: 0.7 G/DL (ref 0.5–1.1)
BETA PERCENT: 10 % (ref 11–19)
BETA-2 MICROGLOBULIN: 2.8 MG/L (ref 0.6–2.4)
BILIRUB SERPL-MCNC: 0.8 MG/DL (ref 0.3–1.2)
BUN BLDV-MCNC: 36 MG/DL (ref 8–23)
CALCIUM SERPL-MCNC: 10.1 MG/DL (ref 8.6–10.4)
CHLORIDE BLD-SCNC: 98 MMOL/L (ref 98–107)
CO2: 26 MMOL/L (ref 20–31)
CREAT SERPL-MCNC: 1.46 MG/DL (ref 0.7–1.2)
FREE KAPPA/LAMBDA RATIO: 11.11 (ref 0.26–1.65)
FREE KAPPA/LAMBDA RATIO: ABNORMAL (ref 0.26–1.65)
GAMMA GLOBULIN %: 17 % (ref 9–20)
GAMMA GLOBULIN: 1.2 G/DL (ref 0.5–1.5)
GFR SERPL CREATININE-BSD FRML MDRD: 50 ML/MIN/1.73M2
GLUCOSE BLD-MCNC: 134 MG/DL (ref 70–99)
IGA: 121 MG/DL (ref 70–400)
IGG: 1507 MG/DL (ref 700–1600)
IGM: 86 MG/DL (ref 40–230)
KAPPA FREE LIGHT CHAINS QNT: 18.45 MG/DL (ref 0.37–1.94)
KAPPA FREE LIGHT CHAINS QNT: 6.82 MG/DL (ref 0.37–1.94)
LAMBDA FREE LIGHT CHAINS QNT: 1.66 MG/DL (ref 0.57–2.63)
LAMBDA FREE LIGHT CHAINS QNT: <0.56 MG/DL (ref 0.57–2.63)
P E INTERPRETATION, U: NORMAL
PATHOLOGIST: ABNORMAL
PATHOLOGIST: NORMAL
PATHOLOGIST: NORMAL
POTASSIUM SERPL-SCNC: 4.5 MMOL/L (ref 3.7–5.3)
PROTEIN ELECTROPHORESIS, SERUM: ABNORMAL
SERUM IFX INTERP: NORMAL
SODIUM BLD-SCNC: 137 MMOL/L (ref 135–144)
SPECIMEN TYPE: NORMAL
TOTAL PROT. SUM,%: 101 % (ref 98–102)
TOTAL PROT. SUM: 7.3 G/DL (ref 6.3–8.2)
TOTAL PROTEIN: 7.2 G/DL (ref 6.4–8.3)
TOTAL PROTEIN: 7.6 G/DL (ref 6.4–8.3)
URINE IFX INTERP: NORMAL
URINE IFX SPECIMEN: NORMAL
URINE TOTAL PROTEIN: 4 MG/DL
URINE TOTAL PROTEIN: 5 MG/DL

## 2023-01-20 ENCOUNTER — HOSPITAL ENCOUNTER (OUTPATIENT)
Facility: CLINIC | Age: 73
Setting detail: SPECIMEN
Discharge: HOME | End: 2023-01-20

## 2023-01-20 LAB
P E INTERPRETATION, U: NORMAL
PATHOLOGIST: NORMAL
SPECIMEN TYPE: 24
URINE IFX INTERP: NORMAL
URINE IFX SPECIMEN: 24
URINE TOTAL PROTEIN: 6 MG/DL
URINE TOTAL PROTEIN: 6 MG/DL
VOLUME: 2336 ML

## 2023-03-14 ENCOUNTER — HOSPITAL ENCOUNTER (OUTPATIENT)
Facility: CLINIC | Age: 73
Discharge: HOME | End: 2023-03-14
Payer: MEDICARE

## 2023-03-14 LAB
CREATININE URINE: 51.9 MG/DL (ref 39–259)
MICROALBUMIN/CREAT 24H UR: <12 MG/L
MICROALBUMIN/CREAT UR-RTO: NORMAL MCG/MG CREAT

## 2023-03-14 PROCEDURE — 82570 ASSAY OF URINE CREATININE: CPT

## 2023-03-14 PROCEDURE — 36415 COLL VENOUS BLD VENIPUNCTURE: CPT

## 2023-03-14 PROCEDURE — 83036 HEMOGLOBIN GLYCOSYLATED A1C: CPT

## 2023-03-14 PROCEDURE — 82043 UR ALBUMIN QUANTITATIVE: CPT

## 2023-03-14 PROCEDURE — 80048 BASIC METABOLIC PNL TOTAL CA: CPT

## 2023-03-15 LAB
ANION GAP SERPL CALCULATED.3IONS-SCNC: 13 MMOL/L (ref 9–17)
BUN SERPL-MCNC: 44 MG/DL (ref 8–23)
CALCIUM SERPL-MCNC: 9.8 MG/DL (ref 8.6–10.4)
CHLORIDE SERPL-SCNC: 98 MMOL/L (ref 98–107)
CO2 SERPL-SCNC: 26 MMOL/L (ref 20–31)
CREAT SERPL-MCNC: 1.57 MG/DL (ref 0.7–1.2)
EST. AVERAGE GLUCOSE BLD GHB EST-MCNC: 171 MG/DL
GFR SERPL CREATININE-BSD FRML MDRD: 46 ML/MIN/1.73M2
GLUCOSE SERPL-MCNC: 123 MG/DL (ref 70–99)
HBA1C MFR BLD: 7.6 % (ref 4–6)
POTASSIUM SERPL-SCNC: 4.4 MMOL/L (ref 3.7–5.3)
SODIUM SERPL-SCNC: 137 MMOL/L (ref 135–144)

## 2023-08-16 ENCOUNTER — HOSPITAL ENCOUNTER (OUTPATIENT)
Facility: CLINIC | Age: 73
Discharge: HOME | End: 2023-08-16
Payer: MEDICARE

## 2023-08-16 LAB
ALBUMIN SERPL-MCNC: 4.4 G/DL (ref 3.5–5.2)
ALBUMIN/GLOB SERPL: 1.3 {RATIO} (ref 1–2.5)
ALP SERPL-CCNC: 64 U/L (ref 40–129)
ALT SERPL-CCNC: 13 U/L (ref 5–41)
ANION GAP SERPL CALCULATED.3IONS-SCNC: 12 MMOL/L (ref 9–17)
AST SERPL-CCNC: 18 U/L
BASOPHILS # BLD: 0.07 K/UL (ref 0–0.2)
BASOPHILS NFR BLD: 1 % (ref 0–2)
BILIRUB SERPL-MCNC: 0.5 MG/DL (ref 0.3–1.2)
BUN SERPL-MCNC: 34 MG/DL (ref 8–23)
CALCIUM SERPL-MCNC: 9.7 MG/DL (ref 8.6–10.4)
CHLORIDE SERPL-SCNC: 97 MMOL/L (ref 98–107)
CO2 SERPL-SCNC: 26 MMOL/L (ref 20–31)
CREAT SERPL-MCNC: 1.6 MG/DL (ref 0.7–1.2)
EOSINOPHIL # BLD: 0.47 K/UL (ref 0–0.44)
EOSINOPHILS RELATIVE PERCENT: 6 % (ref 1–4)
ERYTHROCYTE [DISTWIDTH] IN BLOOD BY AUTOMATED COUNT: 13 % (ref 11.8–14.4)
GFR SERPL CREATININE-BSD FRML MDRD: 45 ML/MIN/1.73M2
GLUCOSE SERPL-MCNC: 179 MG/DL (ref 70–99)
HCT VFR BLD AUTO: 42.3 % (ref 40.7–50.3)
HGB BLD-MCNC: 14.5 G/DL (ref 13–17)
IGA SERPL-MCNC: 122 MG/DL (ref 70–400)
IGG SERPL-MCNC: 1404 MG/DL (ref 700–1600)
IGM SERPL-MCNC: 79 MG/DL (ref 40–230)
IMM GRANULOCYTES # BLD AUTO: 0.03 K/UL (ref 0–0.3)
IMM GRANULOCYTES NFR BLD: 0 %
LYMPHOCYTES NFR BLD: 2.27 K/UL (ref 1.1–3.7)
LYMPHOCYTES RELATIVE PERCENT: 29 % (ref 24–43)
MCH RBC QN AUTO: 33.6 PG (ref 25.2–33.5)
MCHC RBC AUTO-ENTMCNC: 34.3 G/DL (ref 28.4–34.8)
MCV RBC AUTO: 97.9 FL (ref 82.6–102.9)
MONOCYTES NFR BLD: 0.8 K/UL (ref 0.1–1.2)
MONOCYTES NFR BLD: 10 % (ref 3–12)
NEUTROPHILS NFR BLD: 54 % (ref 36–65)
NEUTS SEG NFR BLD: 4.13 K/UL (ref 1.5–8.1)
NRBC BLD-RTO: 0 PER 100 WBC
PLATELET # BLD AUTO: 153 K/UL (ref 138–453)
PMV BLD AUTO: 11.9 FL (ref 8.1–13.5)
POTASSIUM SERPL-SCNC: 4.3 MMOL/L (ref 3.7–5.3)
PROT SERPL-MCNC: 7.7 G/DL (ref 6.4–8.3)
RBC # BLD AUTO: 4.32 M/UL (ref 4.21–5.77)
SODIUM SERPL-SCNC: 135 MMOL/L (ref 135–144)
WBC OTHER # BLD: 7.8 K/UL (ref 3.5–11.3)

## 2023-08-16 PROCEDURE — 85025 COMPLETE CBC W/AUTO DIFF WBC: CPT

## 2023-08-16 PROCEDURE — 86334 IMMUNOFIX E-PHORESIS SERUM: CPT

## 2023-08-16 PROCEDURE — 82784 ASSAY IGA/IGD/IGG/IGM EACH: CPT

## 2023-08-16 PROCEDURE — 80053 COMPREHEN METABOLIC PANEL: CPT

## 2023-08-16 PROCEDURE — 82232 ASSAY OF BETA-2 PROTEIN: CPT

## 2023-08-16 PROCEDURE — 84156 ASSAY OF PROTEIN URINE: CPT

## 2023-08-16 PROCEDURE — 86335 IMMUNFIX E-PHORSIS/URINE/CSF: CPT

## 2023-08-16 PROCEDURE — 83521 IG LIGHT CHAINS FREE EACH: CPT

## 2023-08-16 PROCEDURE — 84155 ASSAY OF PROTEIN SERUM: CPT

## 2023-08-16 PROCEDURE — 84166 PROTEIN E-PHORESIS/URINE/CSF: CPT

## 2023-08-16 PROCEDURE — 36415 COLL VENOUS BLD VENIPUNCTURE: CPT

## 2023-08-16 PROCEDURE — 83520 IMMUNOASSAY QUANT NOS NONAB: CPT

## 2023-08-16 PROCEDURE — 84165 PROTEIN E-PHORESIS SERUM: CPT

## 2023-08-17 LAB
B2 MICROGLOB SERPL IA-MCNC: 3.2 MG/L (ref 0.6–2.4)
FREE KAPPA/LAMBDA RATIO: 9.83 (ref 0.26–1.65)
KAPPA LC FREE SER-MCNC: 178.9 MG/L (ref 3.7–19.4)
LAMBDA LC FREE SERPL-MCNC: 18.2 MG/L (ref 5.7–26.3)

## 2023-08-18 ENCOUNTER — HOSPITAL ENCOUNTER (OUTPATIENT)
Facility: CLINIC | Age: 73
Setting detail: SPECIMEN
Discharge: HOME | End: 2023-08-18

## 2023-08-18 LAB
ALBUMIN PERCENT: 64 % (ref 45–65)
ALBUMIN SERPL-MCNC: 4.7 G/DL (ref 3.2–5.2)
ALPHA 2 PERCENT: 10 % (ref 6–13)
ALPHA1 GLOB SERPL ELPH-MCNC: 0.2 G/DL (ref 0.1–0.4)
ALPHA1 GLOB SERPL ELPH-MCNC: 2 % (ref 3–6)
ALPHA2 GLOB SERPL ELPH-MCNC: 0.7 G/DL (ref 0.5–0.9)
B-GLOBULIN SERPL ELPH-MCNC: 0.7 G/DL (ref 0.5–1.1)
B-GLOBULIN SERPL ELPH-MCNC: 10 % (ref 11–19)
COLLECT DURATION TIME SPEC: ABNORMAL H
FREE KAPPA LT CHAINS,UR: 100.74 MG/L (ref 0–32.9)
FREE LAMBDA LT CHAINS,UR: 3.01 MG/L (ref 0–3.79)
FREE UR LAMBDA EXCRETION/DAY: ABNORMAL MG/D
GAMMA GLOB SERPL ELPH-MCNC: 1.1 G/DL (ref 0.5–1.5)
GAMMA GLOBULIN %: 15 % (ref 9–20)
INTERPRETATION SERPL IFE-IMP: NORMAL
P E INTERPRETATION, U: NORMAL
PATH REV: NORMAL
PATHOLOGIST: ABNORMAL
PATHOLOGIST: NORMAL
PROT PATTERN SERPL ELPH-IMP: ABNORMAL
PROT SERPL-MCNC: 7.4 G/DL (ref 6.4–8.3)
PROTEIN, TOTAL URINE: ABNORMAL MG/D
SPECIMEN TYPE: NORMAL
SPECIMEN TYPE: NORMAL
SPECIMEN VOL ?TM UR: ABNORMAL ML
SPECIMEN VOL UR: NORMAL ML
TOTAL PROT. SUM,%: 101 % (ref 98–102)
TOTAL PROT. SUM: 7.4 G/DL (ref 6.3–8.2)
URINE FREE KAPPA EXCRETION/DAY: ABNORMAL MG/D
URINE IFX INTERP: NORMAL
URINE TOTAL PROTEIN: 7 MG/DL
URINE TOTAL PROTEIN: 7 MG/DL

## 2023-08-19 LAB
P E INTERPRETATION, U: NORMAL
PATHOLOGIST: NORMAL
SPECIMEN TYPE: NORMAL
SPECIMEN TYPE: NORMAL
SPECIMEN VOL UR: 3038 ML
URINE IFX INTERP: NORMAL
URINE TOTAL PROTEIN: 6 MG/DL
URINE TOTAL PROTEIN: 6 MG/DL

## 2023-09-18 ENCOUNTER — HOSPITAL ENCOUNTER (OUTPATIENT)
Facility: CLINIC | Age: 73
Setting detail: SPECIMEN
Discharge: HOME | End: 2023-09-18
Payer: MEDICARE

## 2023-09-18 DIAGNOSIS — N18.31 STAGE 3A CHRONIC KIDNEY DISEASE (HCC): ICD-10-CM

## 2023-09-18 DIAGNOSIS — N18.31 STAGE 3A CHRONIC KIDNEY DISEASE (HCC): Primary | ICD-10-CM

## 2023-09-18 LAB
CREAT UR-MCNC: 49.9 MG/DL (ref 39–259)
MICROALBUMIN UR-MCNC: <12 MG/L (ref 0–20)
MICROALBUMIN/CREAT UR-RTO: NORMAL MCG/MG CREAT (ref 0–17)
TOTAL PROTEIN, URINE: 6 MG/DL

## 2023-09-18 PROCEDURE — 82570 ASSAY OF URINE CREATININE: CPT

## 2023-09-18 PROCEDURE — 82043 UR ALBUMIN QUANTITATIVE: CPT

## 2023-09-18 PROCEDURE — 84156 ASSAY OF PROTEIN URINE: CPT

## 2024-02-21 ENCOUNTER — HOSPITAL ENCOUNTER (OUTPATIENT)
Facility: CLINIC | Age: 74
Discharge: HOME | End: 2024-02-21
Payer: MEDICARE

## 2024-02-21 LAB
ALBUMIN SERPL-MCNC: 4.4 G/DL (ref 3.5–5.2)
ALBUMIN/GLOB SERPL: 1.5 {RATIO} (ref 1–2.5)
ALP SERPL-CCNC: 69 U/L (ref 40–129)
ALT SERPL-CCNC: 14 U/L (ref 5–41)
ANION GAP SERPL CALCULATED.3IONS-SCNC: 11 MMOL/L (ref 9–17)
ANION GAP SERPL CALCULATED.3IONS-SCNC: 12 MMOL/L (ref 9–17)
AST SERPL-CCNC: 13 U/L
B2 MICROGLOB SERPL IA-MCNC: 4.1 MG/L
BASOPHILS # BLD: 0.08 K/UL (ref 0–0.2)
BASOPHILS NFR BLD: 1 % (ref 0–2)
BILIRUB SERPL-MCNC: 0.5 MG/DL (ref 0.3–1.2)
BUN SERPL-MCNC: 47 MG/DL (ref 8–23)
BUN SERPL-MCNC: 48 MG/DL (ref 8–23)
CALCIUM SERPL-MCNC: 9.7 MG/DL (ref 8.6–10.4)
CALCIUM SERPL-MCNC: 9.8 MG/DL (ref 8.6–10.4)
CHLORIDE SERPL-SCNC: 95 MMOL/L (ref 98–107)
CHLORIDE SERPL-SCNC: 97 MMOL/L (ref 98–107)
CO2 SERPL-SCNC: 25 MMOL/L (ref 20–31)
CO2 SERPL-SCNC: 25 MMOL/L (ref 20–31)
CREAT SERPL-MCNC: 1.7 MG/DL (ref 0.7–1.2)
CREAT SERPL-MCNC: 1.7 MG/DL (ref 0.7–1.2)
EOSINOPHIL # BLD: 0.61 K/UL (ref 0–0.44)
EOSINOPHILS RELATIVE PERCENT: 8 % (ref 1–4)
ERYTHROCYTE [DISTWIDTH] IN BLOOD BY AUTOMATED COUNT: 13.2 % (ref 11.8–14.4)
ERYTHROCYTE [DISTWIDTH] IN BLOOD BY AUTOMATED COUNT: 13.2 % (ref 11.8–14.4)
GFR SERPL CREATININE-BSD FRML MDRD: 42 ML/MIN/1.73M2
GFR SERPL CREATININE-BSD FRML MDRD: 42 ML/MIN/1.73M2
GLUCOSE SERPL-MCNC: 259 MG/DL (ref 70–99)
GLUCOSE SERPL-MCNC: 259 MG/DL (ref 70–99)
HCT VFR BLD AUTO: 42.7 % (ref 40.7–50.3)
HCT VFR BLD AUTO: 42.7 % (ref 40.7–50.3)
HGB BLD-MCNC: 14.1 G/DL (ref 13–17)
HGB BLD-MCNC: 14.1 G/DL (ref 13–17)
IGA SERPL-MCNC: 143 MG/DL (ref 70–400)
IGG SERPL-MCNC: 1427 MG/DL (ref 700–1600)
IGM SERPL-MCNC: 86 MG/DL (ref 40–230)
IMM GRANULOCYTES # BLD AUTO: 0.03 K/UL (ref 0–0.3)
IMM GRANULOCYTES NFR BLD: 0 %
LYMPHOCYTES NFR BLD: 1.61 K/UL (ref 1.1–3.7)
LYMPHOCYTES RELATIVE PERCENT: 22 % (ref 24–43)
MCH RBC QN AUTO: 32.2 PG (ref 25.2–33.5)
MCH RBC QN AUTO: 32.2 PG (ref 25.2–33.5)
MCHC RBC AUTO-ENTMCNC: 33 G/DL (ref 28.4–34.8)
MCHC RBC AUTO-ENTMCNC: 33 G/DL (ref 28.4–34.8)
MCV RBC AUTO: 97.5 FL (ref 82.6–102.9)
MCV RBC AUTO: 97.5 FL (ref 82.6–102.9)
MONOCYTES NFR BLD: 0.63 K/UL (ref 0.1–1.2)
MONOCYTES NFR BLD: 9 % (ref 3–12)
NEUTROPHILS NFR BLD: 60 % (ref 36–65)
NEUTS SEG NFR BLD: 4.42 K/UL (ref 1.5–8.1)
NRBC BLD-RTO: 0 PER 100 WBC
NRBC BLD-RTO: 0 PER 100 WBC
PLATELET # BLD AUTO: 143 K/UL (ref 138–453)
PLATELET # BLD AUTO: 143 K/UL (ref 138–453)
PMV BLD AUTO: 11.4 FL (ref 8.1–13.5)
PMV BLD AUTO: 11.4 FL (ref 8.1–13.5)
POTASSIUM SERPL-SCNC: 4.3 MMOL/L (ref 3.7–5.3)
POTASSIUM SERPL-SCNC: 4.3 MMOL/L (ref 3.7–5.3)
PROT SERPL-MCNC: 7.4 G/DL (ref 6.4–8.3)
PSA SERPL-MCNC: 9.64 NG/ML
RBC # BLD AUTO: 4.38 M/UL (ref 4.21–5.77)
RBC # BLD AUTO: 4.38 M/UL (ref 4.21–5.77)
SODIUM SERPL-SCNC: 132 MMOL/L (ref 135–144)
SODIUM SERPL-SCNC: 133 MMOL/L (ref 135–144)
WBC OTHER # BLD: 7.4 K/UL (ref 3.5–11.3)
WBC OTHER # BLD: 7.4 K/UL (ref 3.5–11.3)

## 2024-02-21 PROCEDURE — 85027 COMPLETE CBC AUTOMATED: CPT

## 2024-02-21 PROCEDURE — 80053 COMPREHEN METABOLIC PANEL: CPT

## 2024-02-21 PROCEDURE — 82784 ASSAY IGA/IGD/IGG/IGM EACH: CPT

## 2024-02-21 PROCEDURE — 82232 ASSAY OF BETA-2 PROTEIN: CPT

## 2024-02-21 PROCEDURE — 85025 COMPLETE CBC W/AUTO DIFF WBC: CPT

## 2024-02-21 PROCEDURE — 84155 ASSAY OF PROTEIN SERUM: CPT

## 2024-02-21 PROCEDURE — 84165 PROTEIN E-PHORESIS SERUM: CPT

## 2024-02-21 PROCEDURE — G0103 PSA SCREENING: HCPCS

## 2024-02-21 PROCEDURE — 86334 IMMUNOFIX E-PHORESIS SERUM: CPT

## 2024-02-21 PROCEDURE — 83521 IG LIGHT CHAINS FREE EACH: CPT

## 2024-02-21 PROCEDURE — 36415 COLL VENOUS BLD VENIPUNCTURE: CPT

## 2024-02-21 PROCEDURE — 80048 BASIC METABOLIC PNL TOTAL CA: CPT

## 2024-02-22 LAB
FREE KAPPA/LAMBDA RATIO: 10.64 (ref 0.26–1.65)
KAPPA LC FREE SER-MCNC: 204.3 MG/L (ref 3.7–19.4)
LAMBDA LC FREE SERPL-MCNC: 19.2 MG/L (ref 5.7–26.3)

## 2024-02-23 LAB
ALBUMIN PERCENT: 61 % (ref 45–65)
ALBUMIN SERPL-MCNC: 4.4 G/DL (ref 3.2–5.2)
ALPHA 2 PERCENT: 10 % (ref 6–13)
ALPHA1 GLOB SERPL ELPH-MCNC: 0.2 G/DL (ref 0.1–0.4)
ALPHA1 GLOB SERPL ELPH-MCNC: 3 % (ref 3–6)
ALPHA2 GLOB SERPL ELPH-MCNC: 0.7 G/DL (ref 0.5–0.9)
B-GLOBULIN SERPL ELPH-MCNC: 0.7 G/DL (ref 0.5–1.1)
B-GLOBULIN SERPL ELPH-MCNC: 10 % (ref 11–19)
GAMMA GLOB SERPL ELPH-MCNC: 1.2 G/DL (ref 0.5–1.5)
GAMMA GLOBULIN %: 16 % (ref 9–20)
INTERPRETATION SERPL IFE-IMP: NORMAL
PATH REV: NORMAL
PATHOLOGIST: ABNORMAL
PROT PATTERN SERPL ELPH-IMP: ABNORMAL
PROT SERPL-MCNC: 7.2 G/DL (ref 6.4–8.3)
TOTAL PROT. SUM,%: 100 % (ref 98–102)
TOTAL PROT. SUM: 7.2 G/DL (ref 6.3–8.2)

## 2024-03-17 ENCOUNTER — HOSPITAL ENCOUNTER (OUTPATIENT)
Facility: CLINIC | Age: 74
Discharge: HOME | End: 2024-03-17
Payer: MEDICARE

## 2024-03-17 LAB
EST. AVERAGE GLUCOSE BLD GHB EST-MCNC: 192 MG/DL
HBA1C MFR BLD: 8.3 % (ref 4–6)
PSA FREE MFR SERPL: 31.6 %
PSA FREE SERPL-MCNC: 2.4 UG/L
PSA SERPL-MCNC: 7.6 NG/ML (ref 0–4)

## 2024-03-17 PROCEDURE — 83036 HEMOGLOBIN GLYCOSYLATED A1C: CPT

## 2024-03-17 PROCEDURE — 84154 ASSAY OF PSA FREE: CPT

## 2024-03-17 PROCEDURE — 36415 COLL VENOUS BLD VENIPUNCTURE: CPT

## 2024-07-20 ENCOUNTER — HOSPITAL ENCOUNTER (OUTPATIENT)
Facility: CLINIC | Age: 74
Discharge: HOME | End: 2024-07-20
Payer: MEDICARE

## 2024-07-20 DIAGNOSIS — I10 ESSENTIAL HYPERTENSION: ICD-10-CM

## 2024-07-20 DIAGNOSIS — N18.31 STAGE 3A CHRONIC KIDNEY DISEASE (HCC): ICD-10-CM

## 2024-07-20 DIAGNOSIS — R76.8 ELEVATED SERUM IMMUNOGLOBULIN FREE LIGHT CHAINS: ICD-10-CM

## 2024-07-20 DIAGNOSIS — Z86.39 HISTORY OF DIABETES MELLITUS: ICD-10-CM

## 2024-07-20 LAB
25(OH)D3 SERPL-MCNC: 46.3 NG/ML (ref 30–100)
ANION GAP SERPL CALCULATED.3IONS-SCNC: 8 MMOL/L (ref 9–16)
BASOPHILS # BLD: 0.07 K/UL (ref 0–0.2)
BASOPHILS NFR BLD: 1 % (ref 0–2)
BUN SERPL-MCNC: 47 MG/DL (ref 8–23)
CALCIUM SERPL-MCNC: 10.2 MG/DL (ref 8.6–10.4)
CHLORIDE SERPL-SCNC: 99 MMOL/L (ref 98–107)
CO2 SERPL-SCNC: 28 MMOL/L (ref 20–31)
CREAT SERPL-MCNC: 1.9 MG/DL (ref 0.7–1.2)
CREAT UR-MCNC: 51.8 MG/DL (ref 39–259)
EOSINOPHIL # BLD: 0.59 K/UL (ref 0–0.44)
EOSINOPHILS RELATIVE PERCENT: 7 % (ref 1–4)
ERYTHROCYTE [DISTWIDTH] IN BLOOD BY AUTOMATED COUNT: 12.9 % (ref 11.8–14.4)
GFR, ESTIMATED: 36 ML/MIN/1.73M2
GLUCOSE SERPL-MCNC: 208 MG/DL (ref 74–99)
HCT VFR BLD AUTO: 42.6 % (ref 40.7–50.3)
HGB BLD-MCNC: 14 G/DL (ref 13–17)
IMM GRANULOCYTES # BLD AUTO: 0.05 K/UL (ref 0–0.3)
IMM GRANULOCYTES NFR BLD: 1 %
LYMPHOCYTES NFR BLD: 1.57 K/UL (ref 1.1–3.7)
LYMPHOCYTES RELATIVE PERCENT: 19 % (ref 24–43)
MAGNESIUM SERPL-MCNC: 2.3 MG/DL (ref 1.6–2.4)
MCH RBC QN AUTO: 32.4 PG (ref 25.2–33.5)
MCHC RBC AUTO-ENTMCNC: 32.9 G/DL (ref 28.4–34.8)
MCV RBC AUTO: 98.6 FL (ref 82.6–102.9)
MONOCYTES NFR BLD: 0.81 K/UL (ref 0.1–1.2)
MONOCYTES NFR BLD: 10 % (ref 3–12)
NEUTROPHILS NFR BLD: 62 % (ref 36–65)
NEUTS SEG NFR BLD: 5.02 K/UL (ref 1.5–8.1)
NRBC BLD-RTO: 0 PER 100 WBC
PHOSPHATE SERPL-MCNC: 4.2 MG/DL (ref 2.5–4.5)
PLATELET # BLD AUTO: 163 K/UL (ref 138–453)
PMV BLD AUTO: 11.3 FL (ref 8.1–13.5)
POTASSIUM SERPL-SCNC: 5.1 MMOL/L (ref 3.7–5.3)
PTH-INTACT SERPL-MCNC: 45 PG/ML (ref 15–65)
RBC # BLD AUTO: 4.32 M/UL (ref 4.21–5.77)
SODIUM SERPL-SCNC: 135 MMOL/L (ref 136–145)
TOTAL PROTEIN, URINE: 7 MG/DL
WBC OTHER # BLD: 8.1 K/UL (ref 3.5–11.3)

## 2024-07-20 PROCEDURE — 82306 VITAMIN D 25 HYDROXY: CPT

## 2024-07-20 PROCEDURE — 84156 ASSAY OF PROTEIN URINE: CPT

## 2024-07-20 PROCEDURE — 82570 ASSAY OF URINE CREATININE: CPT

## 2024-07-20 PROCEDURE — 85025 COMPLETE CBC W/AUTO DIFF WBC: CPT

## 2024-07-20 PROCEDURE — 83735 ASSAY OF MAGNESIUM: CPT

## 2024-07-20 PROCEDURE — 36415 COLL VENOUS BLD VENIPUNCTURE: CPT

## 2024-07-20 PROCEDURE — 84100 ASSAY OF PHOSPHORUS: CPT

## 2024-07-20 PROCEDURE — 80048 BASIC METABOLIC PNL TOTAL CA: CPT

## 2024-07-20 PROCEDURE — 83970 ASSAY OF PARATHORMONE: CPT

## 2024-08-28 ENCOUNTER — HOSPITAL ENCOUNTER (OUTPATIENT)
Facility: CLINIC | Age: 74
Discharge: HOME | End: 2024-08-28
Payer: MEDICARE

## 2024-08-28 LAB
ALBUMIN SERPL-MCNC: 4.5 G/DL (ref 3.5–5.2)
ALBUMIN/GLOB SERPL: 1 {RATIO} (ref 1–2.5)
ALP SERPL-CCNC: 65 U/L (ref 40–129)
ALT SERPL-CCNC: 15 U/L (ref 10–50)
ANION GAP SERPL CALCULATED.3IONS-SCNC: 11 MMOL/L (ref 9–16)
AST SERPL-CCNC: 15 U/L (ref 10–50)
B2 MICROGLOB SERPL IA-MCNC: 4.3 MG/L
BASOPHILS # BLD: 0.08 K/UL (ref 0–0.2)
BASOPHILS NFR BLD: 1 % (ref 0–2)
BILIRUB SERPL-MCNC: 0.8 MG/DL (ref 0–1.2)
BUN SERPL-MCNC: 50 MG/DL (ref 8–23)
CALCIUM SERPL-MCNC: 9.9 MG/DL (ref 8.6–10.4)
CHLORIDE SERPL-SCNC: 97 MMOL/L (ref 98–107)
CO2 SERPL-SCNC: 25 MMOL/L (ref 20–31)
CREAT SERPL-MCNC: 2 MG/DL (ref 0.7–1.2)
EOSINOPHIL # BLD: 0.48 K/UL (ref 0–0.44)
EOSINOPHILS RELATIVE PERCENT: 7 % (ref 1–4)
ERYTHROCYTE [DISTWIDTH] IN BLOOD BY AUTOMATED COUNT: 13.2 % (ref 11.8–14.4)
FREE KAPPA/LAMBDA RATIO: 8.36 (ref 0.22–1.74)
GFR, ESTIMATED: 34 ML/MIN/1.73M2
GLUCOSE SERPL-MCNC: 250 MG/DL (ref 74–99)
HCT VFR BLD AUTO: 41.5 % (ref 40.7–50.3)
HGB BLD-MCNC: 13.9 G/DL (ref 13–17)
IGA SERPL-MCNC: 149 MG/DL (ref 70–400)
IGG SERPL-MCNC: 1482 MG/DL (ref 700–1600)
IGM SERPL-MCNC: 87 MG/DL (ref 40–230)
IMM GRANULOCYTES # BLD AUTO: <0.03 K/UL (ref 0–0.3)
IMM GRANULOCYTES NFR BLD: 0 %
KAPPA LC FREE SER-MCNC: 229 MG/L
LAMBDA LC FREE SERPL-MCNC: 27.4 MG/L (ref 4.2–27.7)
LYMPHOCYTES NFR BLD: 1.73 K/UL (ref 1.1–3.7)
LYMPHOCYTES RELATIVE PERCENT: 24 % (ref 24–43)
MCH RBC QN AUTO: 32.6 PG (ref 25.2–33.5)
MCHC RBC AUTO-ENTMCNC: 33.5 G/DL (ref 28.4–34.8)
MCV RBC AUTO: 97.4 FL (ref 82.6–102.9)
MONOCYTES NFR BLD: 0.73 K/UL (ref 0.1–1.2)
MONOCYTES NFR BLD: 10 % (ref 3–12)
NEUTROPHILS NFR BLD: 58 % (ref 36–65)
NEUTS SEG NFR BLD: 4.04 K/UL (ref 1.5–8.1)
NRBC BLD-RTO: 0 PER 100 WBC
PLATELET # BLD AUTO: 164 K/UL (ref 138–453)
PMV BLD AUTO: 11.3 FL (ref 8.1–13.5)
POTASSIUM SERPL-SCNC: 4.6 MMOL/L (ref 3.7–5.3)
PROT SERPL-MCNC: 7.8 G/DL (ref 6.6–8.7)
RBC # BLD AUTO: 4.26 M/UL (ref 4.21–5.77)
SODIUM SERPL-SCNC: 133 MMOL/L (ref 136–145)
WBC OTHER # BLD: 7.1 K/UL (ref 3.5–11.3)

## 2024-08-28 PROCEDURE — 85025 COMPLETE CBC W/AUTO DIFF WBC: CPT

## 2024-08-28 PROCEDURE — 86334 IMMUNOFIX E-PHORESIS SERUM: CPT

## 2024-08-28 PROCEDURE — 36415 COLL VENOUS BLD VENIPUNCTURE: CPT

## 2024-08-28 PROCEDURE — 83521 IG LIGHT CHAINS FREE EACH: CPT

## 2024-08-28 PROCEDURE — 82784 ASSAY IGA/IGD/IGG/IGM EACH: CPT

## 2024-08-28 PROCEDURE — 80053 COMPREHEN METABOLIC PANEL: CPT

## 2024-08-28 PROCEDURE — 82232 ASSAY OF BETA-2 PROTEIN: CPT

## 2024-08-28 PROCEDURE — 84165 PROTEIN E-PHORESIS SERUM: CPT

## 2024-08-28 PROCEDURE — 84155 ASSAY OF PROTEIN SERUM: CPT

## 2024-08-29 LAB
ALBUMIN PERCENT: 57 % (ref 45–65)
ALBUMIN SERPL-MCNC: 4.2 G/DL (ref 3.2–5.2)
ALPHA 2 PERCENT: 9 % (ref 6–13)
ALPHA1 GLOB SERPL ELPH-MCNC: 0.4 G/DL (ref 0.1–0.4)
ALPHA1 GLOB SERPL ELPH-MCNC: 6 % (ref 3–6)
ALPHA2 GLOB SERPL ELPH-MCNC: 0.7 G/DL (ref 0.5–0.9)
B-GLOBULIN SERPL ELPH-MCNC: 0.8 G/DL (ref 0.5–1.1)
B-GLOBULIN SERPL ELPH-MCNC: 11 % (ref 11–19)
GAMMA GLOB SERPL ELPH-MCNC: 1.2 G/DL (ref 0.5–1.5)
GAMMA GLOBULIN %: 17 % (ref 9–20)
ITYP INTERPRETATION: NORMAL
PATH REV: NORMAL
PATHOLOGIST: NORMAL
PROT PATTERN SERPL ELPH-IMP: NORMAL
PROT SERPL-MCNC: 7.4 G/DL (ref 6.6–8.7)
TOTAL PROT. SUM,%: 100 % (ref 98–102)
TOTAL PROT. SUM: 7.3 G/DL (ref 6.3–8.2)

## 2024-09-26 ENCOUNTER — HOSPITAL ENCOUNTER (OUTPATIENT)
Dept: CT IMAGING | Age: 74
Discharge: HOME | End: 2024-09-28
Payer: MEDICARE

## 2024-09-26 VITALS
BODY MASS INDEX: 25.68 KG/M2 | HEIGHT: 70 IN | TEMPERATURE: 97.5 F | SYSTOLIC BLOOD PRESSURE: 157 MMHG | DIASTOLIC BLOOD PRESSURE: 74 MMHG | WEIGHT: 179.4 LBS | OXYGEN SATURATION: 98 % | HEART RATE: 72 BPM | RESPIRATION RATE: 18 BRPM

## 2024-09-26 DIAGNOSIS — D47.2 MONOCLONAL PARAPROTEINEMIA: ICD-10-CM

## 2024-09-26 LAB
BASOPHILS # BLD: 0.07 K/UL (ref 0–0.2)
BASOPHILS NFR BLD: 1 % (ref 0–2)
EOSINOPHIL # BLD: 0.49 K/UL (ref 0–0.44)
EOSINOPHILS RELATIVE PERCENT: 7 % (ref 1–4)
ERYTHROCYTE [DISTWIDTH] IN BLOOD BY AUTOMATED COUNT: 13.4 % (ref 11.8–14.4)
HCT VFR BLD AUTO: 40.6 % (ref 40.7–50.3)
HGB BLD-MCNC: 14 G/DL (ref 13–17)
IMM GRANULOCYTES # BLD AUTO: 0.03 K/UL (ref 0–0.3)
IMM GRANULOCYTES NFR BLD: 0 %
IMM RETICS NFR: 9.7 % (ref 2.7–18.3)
LYMPHOCYTES NFR BLD: 1.42 K/UL (ref 1.1–3.7)
LYMPHOCYTES RELATIVE PERCENT: 20 % (ref 24–43)
MCH RBC QN AUTO: 33.7 PG (ref 25.2–33.5)
MCHC RBC AUTO-ENTMCNC: 34.5 G/DL (ref 28.4–34.8)
MCV RBC AUTO: 97.8 FL (ref 82.6–102.9)
MONOCYTES NFR BLD: 0.74 K/UL (ref 0.1–1.2)
MONOCYTES NFR BLD: 10 % (ref 3–12)
NEUTROPHILS NFR BLD: 62 % (ref 36–65)
NEUTS SEG NFR BLD: 4.47 K/UL (ref 1.5–8.1)
NRBC BLD-RTO: 0 PER 100 WBC
PLATELET # BLD AUTO: 153 K/UL (ref 138–453)
PMV BLD AUTO: 11 FL (ref 8.1–13.5)
RBC # BLD AUTO: 4.15 M/UL (ref 4.21–5.77)
RETIC HEMOGLOBIN: 35.9 PG (ref 28.2–35.7)
RETICS # AUTO: 0.09 M/UL (ref 0.03–0.08)
RETICS/RBC NFR AUTO: 2.1 % (ref 0.5–1.9)
WBC OTHER # BLD: 7.2 K/UL (ref 3.5–11.3)

## 2024-09-26 PROCEDURE — 88280 CHROMOSOME KARYOTYPE STUDY: CPT

## 2024-09-26 PROCEDURE — 85025 COMPLETE CBC W/AUTO DIFF WBC: CPT

## 2024-09-26 PROCEDURE — 88271 CYTOGENETICS DNA PROBE: CPT

## 2024-09-26 PROCEDURE — 88237 TISSUE CULTURE BONE MARROW: CPT

## 2024-09-26 PROCEDURE — 77012 CT SCAN FOR NEEDLE BIOPSY: CPT

## 2024-09-26 PROCEDURE — 88275 CYTOGENETICS 100-300: CPT

## 2024-09-26 PROCEDURE — 6360000002 HC RX W HCPCS: Performed by: RADIOLOGY

## 2024-09-26 PROCEDURE — 85045 AUTOMATED RETICULOCYTE COUNT: CPT

## 2024-09-26 PROCEDURE — 88264 CHROMOSOME ANALYSIS 20-25: CPT

## 2024-09-26 RX ORDER — SODIUM CHLORIDE 9 MG/ML
INJECTION, SOLUTION INTRAVENOUS PRN
Status: DISCONTINUED | OUTPATIENT
Start: 2024-09-26 | End: 2024-09-29 | Stop reason: HOSPADM

## 2024-09-26 RX ORDER — SODIUM CHLORIDE 0.9 % (FLUSH) 0.9 %
5-40 SYRINGE (ML) INJECTION EVERY 12 HOURS SCHEDULED
Status: DISCONTINUED | OUTPATIENT
Start: 2024-09-26 | End: 2024-09-29 | Stop reason: HOSPADM

## 2024-09-26 RX ORDER — ACETAMINOPHEN 325 MG/1
650 TABLET ORAL EVERY 4 HOURS PRN
Status: DISCONTINUED | OUTPATIENT
Start: 2024-09-26 | End: 2024-09-29 | Stop reason: HOSPADM

## 2024-09-26 RX ORDER — SODIUM CHLORIDE 0.9 % (FLUSH) 0.9 %
5-40 SYRINGE (ML) INJECTION PRN
Status: DISCONTINUED | OUTPATIENT
Start: 2024-09-26 | End: 2024-09-29 | Stop reason: HOSPADM

## 2024-09-26 RX ORDER — FENTANYL CITRATE 0.05 MG/ML
INJECTION, SOLUTION INTRAMUSCULAR; INTRAVENOUS PRN
Status: COMPLETED | OUTPATIENT
Start: 2024-09-26 | End: 2024-09-26

## 2024-09-26 RX ADMIN — FENTANYL CITRATE 50 MCG: 0.05 INJECTION, SOLUTION INTRAMUSCULAR; INTRAVENOUS at 11:03

## 2024-09-26 ASSESSMENT — PAIN - FUNCTIONAL ASSESSMENT
PAIN_FUNCTIONAL_ASSESSMENT: 0-10
PAIN_FUNCTIONAL_ASSESSMENT: NONE - DENIES PAIN

## 2024-10-01 LAB
BONE MARROW REPORT: NORMAL
CHROMOSOME STUDY: NORMAL
FLOW CYTOMETRY, BM: NORMAL

## 2024-11-14 ENCOUNTER — HOSPITAL ENCOUNTER (OUTPATIENT)
Facility: CLINIC | Age: 74
Discharge: HOME | End: 2024-11-14
Payer: MEDICARE

## 2024-11-14 DIAGNOSIS — Z86.39 HISTORY OF DIABETES MELLITUS: ICD-10-CM

## 2024-11-14 DIAGNOSIS — R76.8 ELEVATED SERUM IMMUNOGLOBULIN FREE LIGHT CHAINS: ICD-10-CM

## 2024-11-14 DIAGNOSIS — R97.20 ELEVATED PSA: ICD-10-CM

## 2024-11-14 DIAGNOSIS — I10 ESSENTIAL HYPERTENSION: ICD-10-CM

## 2024-11-14 DIAGNOSIS — N18.32 STAGE 3B CHRONIC KIDNEY DISEASE (HCC): ICD-10-CM

## 2024-11-14 LAB
25(OH)D3 SERPL-MCNC: 46.5 NG/ML (ref 30–100)
ANION GAP SERPL CALCULATED.3IONS-SCNC: 12 MMOL/L (ref 9–16)
BASOPHILS # BLD: 0.06 K/UL (ref 0–0.2)
BASOPHILS NFR BLD: 1 % (ref 0–2)
BUN SERPL-MCNC: 51 MG/DL (ref 8–23)
CALCIUM SERPL-MCNC: 9.8 MG/DL (ref 8.6–10.4)
CHLORIDE SERPL-SCNC: 100 MMOL/L (ref 98–107)
CO2 SERPL-SCNC: 26 MMOL/L (ref 20–31)
CREAT SERPL-MCNC: 1.7 MG/DL (ref 0.7–1.2)
CREAT UR-MCNC: 49.5 MG/DL (ref 39–259)
EOSINOPHIL # BLD: 0.53 K/UL (ref 0–0.44)
EOSINOPHILS RELATIVE PERCENT: 7 % (ref 1–4)
ERYTHROCYTE [DISTWIDTH] IN BLOOD BY AUTOMATED COUNT: 13 % (ref 11.8–14.4)
GFR, ESTIMATED: 41 ML/MIN/1.73M2
GLUCOSE SERPL-MCNC: 156 MG/DL (ref 74–99)
HCT VFR BLD AUTO: 43.2 % (ref 40.7–50.3)
HGB BLD-MCNC: 14.2 G/DL (ref 13–17)
IMM GRANULOCYTES # BLD AUTO: <0.03 K/UL (ref 0–0.3)
IMM GRANULOCYTES NFR BLD: 0 %
LYMPHOCYTES NFR BLD: 1.88 K/UL (ref 1.1–3.7)
LYMPHOCYTES RELATIVE PERCENT: 25 % (ref 24–43)
MAGNESIUM SERPL-MCNC: 2.4 MG/DL (ref 1.6–2.4)
MCH RBC QN AUTO: 31.7 PG (ref 25.2–33.5)
MCHC RBC AUTO-ENTMCNC: 32.9 G/DL (ref 28.4–34.8)
MCV RBC AUTO: 96.4 FL (ref 82.6–102.9)
MICROALBUMIN UR-MCNC: 18 MG/L (ref 0–20)
MICROALBUMIN/CREAT UR-RTO: 36 MCG/MG CREAT (ref 0–17)
MONOCYTES NFR BLD: 0.88 K/UL (ref 0.1–1.2)
MONOCYTES NFR BLD: 12 % (ref 3–12)
NEUTROPHILS NFR BLD: 55 % (ref 36–65)
NEUTS SEG NFR BLD: 4.22 K/UL (ref 1.5–8.1)
NRBC BLD-RTO: 0 PER 100 WBC
PHOSPHATE SERPL-MCNC: 4 MG/DL (ref 2.5–4.5)
PLATELET # BLD AUTO: 135 K/UL (ref 138–453)
PMV BLD AUTO: 10.8 FL (ref 8.1–13.5)
POTASSIUM SERPL-SCNC: 4.5 MMOL/L (ref 3.7–5.3)
PTH-INTACT SERPL-MCNC: 47 PG/ML (ref 15–65)
RBC # BLD AUTO: 4.48 M/UL (ref 4.21–5.77)
SODIUM SERPL-SCNC: 138 MMOL/L (ref 136–145)
WBC OTHER # BLD: 7.6 K/UL (ref 3.5–11.3)

## 2024-11-14 PROCEDURE — 36415 COLL VENOUS BLD VENIPUNCTURE: CPT

## 2024-11-14 PROCEDURE — 82306 VITAMIN D 25 HYDROXY: CPT

## 2024-11-14 PROCEDURE — 84100 ASSAY OF PHOSPHORUS: CPT

## 2024-11-14 PROCEDURE — 83970 ASSAY OF PARATHORMONE: CPT

## 2024-11-14 PROCEDURE — 83735 ASSAY OF MAGNESIUM: CPT

## 2024-11-14 PROCEDURE — 80048 BASIC METABOLIC PNL TOTAL CA: CPT

## 2024-11-14 PROCEDURE — 82570 ASSAY OF URINE CREATININE: CPT

## 2024-11-14 PROCEDURE — 85025 COMPLETE CBC W/AUTO DIFF WBC: CPT

## 2024-11-14 PROCEDURE — 82043 UR ALBUMIN QUANTITATIVE: CPT

## 2024-12-04 ENCOUNTER — APPOINTMENT (OUTPATIENT)
Dept: CT IMAGING | Facility: CLINIC | Age: 74
End: 2024-12-04
Payer: MEDICARE

## 2024-12-04 ENCOUNTER — HOSPITAL ENCOUNTER (EMERGENCY)
Facility: CLINIC | Age: 74
Discharge: HOME | End: 2024-12-04
Attending: SPECIALIST
Payer: MEDICARE

## 2024-12-04 ENCOUNTER — APPOINTMENT (OUTPATIENT)
Dept: GENERAL RADIOLOGY | Facility: CLINIC | Age: 74
End: 2024-12-04
Payer: MEDICARE

## 2024-12-04 VITALS
DIASTOLIC BLOOD PRESSURE: 74 MMHG | HEART RATE: 76 BPM | HEIGHT: 70 IN | OXYGEN SATURATION: 98 % | WEIGHT: 180 LBS | RESPIRATION RATE: 15 BRPM | BODY MASS INDEX: 25.77 KG/M2 | TEMPERATURE: 98.2 F | SYSTOLIC BLOOD PRESSURE: 143 MMHG

## 2024-12-04 DIAGNOSIS — R10.13 ABDOMINAL PAIN, EPIGASTRIC: ICD-10-CM

## 2024-12-04 DIAGNOSIS — R07.9 CHEST PAIN, UNSPECIFIED TYPE: Primary | ICD-10-CM

## 2024-12-04 LAB
ALBUMIN SERPL-MCNC: 4.5 G/DL (ref 3.5–5.2)
ALBUMIN/GLOB SERPL: 1.3 {RATIO} (ref 1–2.5)
ALP SERPL-CCNC: 72 U/L (ref 40–129)
ALT SERPL-CCNC: 17 U/L (ref 5–41)
ANION GAP SERPL CALCULATED.3IONS-SCNC: 14 MMOL/L (ref 9–17)
AST SERPL-CCNC: 18 U/L
BASOPHILS # BLD: 0.1 K/UL (ref 0–0.2)
BASOPHILS NFR BLD: 1 % (ref 0–2)
BILIRUB DIRECT SERPL-MCNC: 0.1 MG/DL
BILIRUB INDIRECT SERPL-MCNC: 0.4 MG/DL (ref 0–1)
BILIRUB SERPL-MCNC: 0.5 MG/DL (ref 0.3–1.2)
BUN SERPL-MCNC: 42 MG/DL (ref 8–23)
CALCIUM SERPL-MCNC: 10.1 MG/DL (ref 8.6–10.4)
CHLORIDE SERPL-SCNC: 98 MMOL/L (ref 98–107)
CO2 SERPL-SCNC: 23 MMOL/L (ref 20–31)
CREAT SERPL-MCNC: 1.5 MG/DL (ref 0.7–1.2)
D DIMER PPP FEU-MCNC: 0.57 UG/ML FEU
EOSINOPHIL # BLD: 0.5 K/UL (ref 0–0.4)
EOSINOPHILS RELATIVE PERCENT: 6 % (ref 1–4)
ERYTHROCYTE [DISTWIDTH] IN BLOOD BY AUTOMATED COUNT: 13.9 % (ref 12.5–15.4)
GFR, ESTIMATED: 48 ML/MIN/1.73M2
GLUCOSE SERPL-MCNC: 204 MG/DL (ref 70–99)
HCT VFR BLD AUTO: 42.8 % (ref 41–53)
HGB BLD-MCNC: 14.9 G/DL (ref 13.5–17.5)
LIPASE SERPL-CCNC: 18 U/L (ref 13–60)
LYMPHOCYTES NFR BLD: 1.7 K/UL (ref 1–4.8)
LYMPHOCYTES RELATIVE PERCENT: 18 % (ref 24–44)
MAGNESIUM SERPL-MCNC: 2.1 MG/DL (ref 1.6–2.6)
MCH RBC QN AUTO: 33.2 PG (ref 26–34)
MCHC RBC AUTO-ENTMCNC: 34.7 G/DL (ref 31–37)
MCV RBC AUTO: 95.6 FL (ref 80–100)
MONOCYTES NFR BLD: 0.7 K/UL (ref 0.1–1.2)
MONOCYTES NFR BLD: 8 % (ref 2–11)
NEUTROPHILS NFR BLD: 67 % (ref 36–66)
NEUTS SEG NFR BLD: 6 K/UL (ref 1.8–7.7)
PLATELET # BLD AUTO: 159 K/UL (ref 140–450)
PMV BLD AUTO: 9.2 FL (ref 6–12)
POTASSIUM SERPL-SCNC: 4.1 MMOL/L (ref 3.7–5.3)
PROT SERPL-MCNC: 8.1 G/DL (ref 6.4–8.3)
RBC # BLD AUTO: 4.48 M/UL (ref 4.5–5.9)
SODIUM SERPL-SCNC: 135 MMOL/L (ref 135–144)
TROPONIN I SERPL HS-MCNC: 23 NG/L (ref 0–22)
TROPONIN I SERPL HS-MCNC: 25 NG/L (ref 0–22)
WBC OTHER # BLD: 9 K/UL (ref 3.5–11)

## 2024-12-04 PROCEDURE — 70450 CT HEAD/BRAIN W/O DYE: CPT

## 2024-12-04 PROCEDURE — 71045 X-RAY EXAM CHEST 1 VIEW: CPT

## 2024-12-04 PROCEDURE — 80048 BASIC METABOLIC PNL TOTAL CA: CPT

## 2024-12-04 PROCEDURE — 85025 COMPLETE CBC W/AUTO DIFF WBC: CPT

## 2024-12-04 PROCEDURE — 83735 ASSAY OF MAGNESIUM: CPT

## 2024-12-04 PROCEDURE — 80076 HEPATIC FUNCTION PANEL: CPT

## 2024-12-04 PROCEDURE — 99285 EMERGENCY DEPT VISIT HI MDM: CPT

## 2024-12-04 PROCEDURE — 84484 ASSAY OF TROPONIN QUANT: CPT

## 2024-12-04 PROCEDURE — 36415 COLL VENOUS BLD VENIPUNCTURE: CPT

## 2024-12-04 PROCEDURE — 6370000000 HC RX 637 (ALT 250 FOR IP)

## 2024-12-04 PROCEDURE — 83690 ASSAY OF LIPASE: CPT

## 2024-12-04 PROCEDURE — 93005 ELECTROCARDIOGRAM TRACING: CPT | Performed by: SPECIALIST

## 2024-12-04 PROCEDURE — 85379 FIBRIN DEGRADATION QUANT: CPT

## 2024-12-04 RX ORDER — ASPIRIN 81 MG/1
81 TABLET, CHEWABLE ORAL ONCE
Status: COMPLETED | OUTPATIENT
Start: 2024-12-04 | End: 2024-12-04

## 2024-12-04 RX ORDER — ASPIRIN 81 MG/1
TABLET, CHEWABLE ORAL
Status: COMPLETED
Start: 2024-12-04 | End: 2024-12-04

## 2024-12-04 RX ADMIN — ASPIRIN 81 MG: 81 TABLET, CHEWABLE ORAL at 21:00

## 2024-12-04 ASSESSMENT — PAIN SCALES - GENERAL
PAINLEVEL_OUTOF10: 2
PAINLEVEL_OUTOF10: 5

## 2024-12-04 ASSESSMENT — PAIN DESCRIPTION - ORIENTATION
ORIENTATION: MID
ORIENTATION: MID

## 2024-12-04 ASSESSMENT — PAIN - FUNCTIONAL ASSESSMENT
PAIN_FUNCTIONAL_ASSESSMENT: 0-10
PAIN_FUNCTIONAL_ASSESSMENT: ACTIVITIES ARE NOT PREVENTED
PAIN_FUNCTIONAL_ASSESSMENT: NONE - DENIES PAIN
PAIN_FUNCTIONAL_ASSESSMENT: 0-10

## 2024-12-04 ASSESSMENT — PAIN DESCRIPTION - DESCRIPTORS
DESCRIPTORS: TIGHTNESS
DESCRIPTORS: TIGHTNESS

## 2024-12-04 ASSESSMENT — PAIN DESCRIPTION - LOCATION
LOCATION: CHEST
LOCATION: CHEST

## 2024-12-04 ASSESSMENT — PAIN DESCRIPTION - PAIN TYPE: TYPE: ACUTE PAIN

## 2024-12-04 ASSESSMENT — PAIN DESCRIPTION - FREQUENCY: FREQUENCY: CONTINUOUS

## 2024-12-05 LAB
EKG ATRIAL RATE: 72 BPM
EKG ATRIAL RATE: 75 BPM
EKG P AXIS: 49 DEGREES
EKG P AXIS: 58 DEGREES
EKG P-R INTERVAL: 156 MS
EKG P-R INTERVAL: 156 MS
EKG Q-T INTERVAL: 378 MS
EKG Q-T INTERVAL: 382 MS
EKG QRS DURATION: 88 MS
EKG QRS DURATION: 90 MS
EKG QTC CALCULATION (BAZETT): 418 MS
EKG QTC CALCULATION (BAZETT): 422 MS
EKG R AXIS: -57 DEGREES
EKG R AXIS: -64 DEGREES
EKG T AXIS: 29 DEGREES
EKG T AXIS: 51 DEGREES
EKG VENTRICULAR RATE: 72 BPM
EKG VENTRICULAR RATE: 75 BPM

## 2024-12-05 ASSESSMENT — ENCOUNTER SYMPTOMS
BACK PAIN: 0
WHEEZING: 0
BLOOD IN STOOL: 0
ABDOMINAL PAIN: 1
NAUSEA: 0
CHEST TIGHTNESS: 1
SORE THROAT: 0
SHORTNESS OF BREATH: 1
COUGH: 0

## 2024-12-05 NOTE — ED PROVIDER NOTES
and has required surgery about 4 to 5 years ago.  He has had nuclear stress test about 2 years ago which reveals reversible ischemia in the inferior wall.  His creatinine has been running around 1.7 and he follows up with nephrologist Dr. Dunn.    REVIEW OF SYSTEMS     Review of Systems   Constitutional:  Negative for chills, diaphoresis and fever.   HENT:  Negative for congestion and sore throat.    Respiratory:  Positive for chest tightness and shortness of breath. Negative for cough and wheezing.    Cardiovascular:  Negative for leg swelling.   Gastrointestinal:  Positive for abdominal pain. Negative for blood in stool and nausea.   Genitourinary:  Negative for dysuria and frequency.   Musculoskeletal:  Negative for back pain, myalgias and neck pain.   Skin:  Negative for rash.   Neurological:  Negative for dizziness, syncope and light-headedness.   All other systems reviewed and are negative.      PAST MEDICAL HISTORY    has a past medical history of BPH (benign prostatic hyperplasia), CAD (coronary artery disease), CKD (chronic kidney disease), GERD (gastroesophageal reflux disease), Hyperlipidemia, Hypertension, Osteoarthritis, Thrush, and Type II or unspecified type diabetes mellitus without mention of complication, not stated as uncontrolled.    SURGICAL HISTORY      has a past surgical history that includes hernia repair; Carpal tunnel release; Finger trigger release; Tonsillectomy and adenoidectomy; Anterior cervical discectomy w/ fusion; lipoma resection; Colonoscopy; Endoscopy, colon, diagnostic; and CT BIOPSY BONE MARROW (9/26/2024).    CURRENT MEDICATIONS       Discharge Medication List as of 12/4/2024 11:21 PM        CONTINUE these medications which have NOT CHANGED    Details   Dextromethorphan-guaiFENesin (MUCINEX DM PO) Take 1,200 mg by mouth in the morning and at bedtimeHistorical Med      dapagliflozin (FARXIGA) 5 MG tablet Take 1 tablet by mouth every morning, Disp-30 tablet, R-5Normal

## 2024-12-05 NOTE — ED NOTES
Pt presents to the ED via private auto. Pt states he began to experience mid-epigastric tightness approx 4 hours prior to arrival. Pt stated he felt a bit short of breath when the chest/epigastric tightness occurred.    
normal...

## 2024-12-05 NOTE — DISCHARGE INSTRUCTIONS
PLEASE RETURN TO THE EMERGENCY DEPARTMENT IMMEDIATELY if your symptoms worsen in anyway or in 8-12 hours if not improved for re-evaluation.  You should immediately return to the ER for symptoms such as increasing pain, shortness of breath, fever, a feeling of passing out, light headed, dizziness, abdominal pain, numbness or weakness to the arms or legs, coolness or color change of the arms or legs.      Take your medication as indicated and prescribed.  If you are given an antibiotic then, make sure you get the prescription filled and take the antibiotics until finished.      Please understand that at this time there is no evidence for a more serious underlying process, but that early in the process of an illness or injury, an emergency department workup can be falsely reassuring.  You should contact your family doctor within the next 24 hours for a follow up appointment    THANK YOU!!!    From [facility] and [facility]    On behalf of the Emergency Department staff at [facility], I would like to thank you for giving us the opportunity to address your health care needs and concerns.    We hope that during your visit, our service was delivered in a professional and caring manner. Please keep [facility] in mind as we walk with you down the path to your own personal wellness.     Please expect an automated text message or email from us so we can ask a few questions about your health and progress. Based on your answers, a clinician may call you back to offer help and instructions.    Please understand that early in the process of an illness or injury, an emergency department workup can be falsely reassuring.  If you notice any worsening, changing or persistent symptoms please call your family doctor or return to the ER immediately.     Tell us how we did during your visit at [email or web address]   and let us know about your experience

## 2025-01-16 ENCOUNTER — HOSPITAL ENCOUNTER (OUTPATIENT)
Facility: CLINIC | Age: 75
Discharge: HOME | End: 2025-01-16
Payer: MEDICARE

## 2025-01-16 LAB
ALBUMIN SERPL-MCNC: 4.3 G/DL (ref 3.5–5.2)
ALBUMIN/GLOB SERPL: 1.4 {RATIO} (ref 1–2.5)
ALP SERPL-CCNC: 66 U/L (ref 40–129)
ALT SERPL-CCNC: 12 U/L (ref 10–50)
ANION GAP SERPL CALCULATED.3IONS-SCNC: 10 MMOL/L (ref 9–16)
AST SERPL-CCNC: 13 U/L (ref 10–50)
B2 MICROGLOB SERPL IA-MCNC: 4.1 MG/L
BASOPHILS # BLD: 0.07 K/UL (ref 0–0.2)
BASOPHILS NFR BLD: 1 % (ref 0–2)
BILIRUB SERPL-MCNC: 0.9 MG/DL (ref 0–1.2)
BUN SERPL-MCNC: 45 MG/DL (ref 8–23)
CALCIUM SERPL-MCNC: 10 MG/DL (ref 8.6–10.4)
CHLORIDE SERPL-SCNC: 99 MMOL/L (ref 98–107)
CHOLEST SERPL-MCNC: 135 MG/DL (ref 0–199)
CHOLESTEROL/HDL RATIO: 2
CO2 SERPL-SCNC: 27 MMOL/L (ref 20–31)
CREAT SERPL-MCNC: 1.7 MG/DL (ref 0.7–1.2)
EOSINOPHIL # BLD: 0.58 K/UL (ref 0–0.44)
EOSINOPHILS RELATIVE PERCENT: 7 % (ref 1–4)
ERYTHROCYTE [DISTWIDTH] IN BLOOD BY AUTOMATED COUNT: 13 % (ref 11.8–14.4)
FREE KAPPA/LAMBDA RATIO: 5.48 (ref 0.22–1.74)
GFR, ESTIMATED: 41 ML/MIN/1.73M2
GLUCOSE SERPL-MCNC: 176 MG/DL (ref 74–99)
HCT VFR BLD AUTO: 43.7 % (ref 40.7–50.3)
HDLC SERPL-MCNC: 67 MG/DL
HGB BLD-MCNC: 14.1 G/DL (ref 13–17)
IGA SERPL-MCNC: 130 MG/DL (ref 70–400)
IGG SERPL-MCNC: 1523 MG/DL (ref 700–1600)
IGM SERPL-MCNC: 94 MG/DL (ref 40–230)
IMM GRANULOCYTES # BLD AUTO: <0.03 K/UL (ref 0–0.3)
IMM GRANULOCYTES NFR BLD: 0 %
KAPPA LC FREE SER-MCNC: 166 MG/L
LAMBDA LC FREE SERPL-MCNC: 30.3 MG/L (ref 4.2–27.7)
LDLC SERPL CALC-MCNC: 58 MG/DL (ref 0–100)
LYMPHOCYTES NFR BLD: 1.82 K/UL (ref 1.1–3.7)
LYMPHOCYTES RELATIVE PERCENT: 20 % (ref 24–43)
MCH RBC QN AUTO: 31.5 PG (ref 25.2–33.5)
MCHC RBC AUTO-ENTMCNC: 32.3 G/DL (ref 28.4–34.8)
MCV RBC AUTO: 97.8 FL (ref 82.6–102.9)
MONOCYTES NFR BLD: 0.79 K/UL (ref 0.1–1.2)
MONOCYTES NFR BLD: 9 % (ref 3–12)
NEUTROPHILS NFR BLD: 63 % (ref 36–65)
NEUTS SEG NFR BLD: 5.63 K/UL (ref 1.5–8.1)
NRBC BLD-RTO: 0 PER 100 WBC
PLATELET # BLD AUTO: 111 K/UL (ref 138–453)
PMV BLD AUTO: 11.5 FL (ref 8.1–13.5)
POTASSIUM SERPL-SCNC: 4.7 MMOL/L (ref 3.7–5.3)
PROT SERPL-MCNC: 7.4 G/DL (ref 6.6–8.7)
RBC # BLD AUTO: 4.47 M/UL (ref 4.21–5.77)
SODIUM SERPL-SCNC: 136 MMOL/L (ref 136–145)
TRIGL SERPL-MCNC: 49 MG/DL
VLDLC SERPL CALC-MCNC: 10 MG/DL (ref 1–30)
WBC OTHER # BLD: 8.9 K/UL (ref 3.5–11.3)

## 2025-01-16 PROCEDURE — 36415 COLL VENOUS BLD VENIPUNCTURE: CPT

## 2025-01-16 PROCEDURE — 84165 PROTEIN E-PHORESIS SERUM: CPT

## 2025-01-16 PROCEDURE — 82232 ASSAY OF BETA-2 PROTEIN: CPT

## 2025-01-16 PROCEDURE — 83521 IG LIGHT CHAINS FREE EACH: CPT

## 2025-01-16 PROCEDURE — 84155 ASSAY OF PROTEIN SERUM: CPT

## 2025-01-16 PROCEDURE — 85025 COMPLETE CBC W/AUTO DIFF WBC: CPT

## 2025-01-16 PROCEDURE — 86334 IMMUNOFIX E-PHORESIS SERUM: CPT

## 2025-01-16 PROCEDURE — 82784 ASSAY IGA/IGD/IGG/IGM EACH: CPT

## 2025-01-16 PROCEDURE — 80053 COMPREHEN METABOLIC PANEL: CPT

## 2025-01-16 PROCEDURE — 80061 LIPID PANEL: CPT

## 2025-01-17 LAB
ALBUMIN PERCENT: 57 % (ref 45–65)
ALBUMIN SERPL-MCNC: 4.1 G/DL (ref 3.2–5.2)
ALPHA 2 PERCENT: 10 % (ref 6–13)
ALPHA1 GLOB SERPL ELPH-MCNC: 0.4 G/DL (ref 0.1–0.4)
ALPHA1 GLOB SERPL ELPH-MCNC: 5 % (ref 3–6)
ALPHA2 GLOB SERPL ELPH-MCNC: 0.7 G/DL (ref 0.5–0.9)
B-GLOBULIN SERPL ELPH-MCNC: 0.9 G/DL (ref 0.5–1.1)
B-GLOBULIN SERPL ELPH-MCNC: 12 % (ref 11–19)
GAMMA GLOB SERPL ELPH-MCNC: 1.2 G/DL (ref 0.5–1.5)
GAMMA GLOBULIN %: 17 % (ref 9–20)
ITYP INTERPRETATION: NORMAL
PATH REV: NORMAL
PATHOLOGIST: NORMAL
PROT PATTERN SERPL ELPH-IMP: NORMAL
PROT SERPL-MCNC: 7.3 G/DL (ref 6.6–8.7)
TOTAL PROT. SUM,%: 101 % (ref 98–102)
TOTAL PROT. SUM: 7.3 G/DL (ref 6.3–8.2)

## 2025-02-18 ENCOUNTER — HOSPITAL ENCOUNTER (OUTPATIENT)
Facility: CLINIC | Age: 75
Discharge: HOME | End: 2025-02-18
Payer: MEDICARE

## 2025-02-18 DIAGNOSIS — R76.8 ELEVATED SERUM IMMUNOGLOBULIN FREE LIGHT CHAINS: ICD-10-CM

## 2025-02-18 DIAGNOSIS — N18.32 STAGE 3B CHRONIC KIDNEY DISEASE (HCC): ICD-10-CM

## 2025-02-18 DIAGNOSIS — I10 ESSENTIAL HYPERTENSION: ICD-10-CM

## 2025-02-18 DIAGNOSIS — Z86.39 HISTORY OF DIABETES MELLITUS: ICD-10-CM

## 2025-02-18 LAB
25(OH)D3 SERPL-MCNC: 46.3 NG/ML (ref 30–100)
ALBUMIN SERPL-MCNC: 4.5 G/DL (ref 3.5–5.2)
ALBUMIN/GLOB SERPL: 1.4 {RATIO} (ref 1–2.5)
ALP SERPL-CCNC: 64 U/L (ref 40–129)
ALT SERPL-CCNC: 19 U/L (ref 10–50)
ANION GAP SERPL CALCULATED.3IONS-SCNC: 12 MMOL/L (ref 9–16)
ANION GAP SERPL CALCULATED.3IONS-SCNC: 14 MMOL/L (ref 9–16)
AST SERPL-CCNC: 14 U/L (ref 10–50)
BASOPHILS # BLD: 0.05 K/UL (ref 0–0.2)
BASOPHILS # BLD: 0.05 K/UL (ref 0–0.2)
BASOPHILS NFR BLD: 1 % (ref 0–2)
BASOPHILS NFR BLD: 1 % (ref 0–2)
BILIRUB SERPL-MCNC: 0.5 MG/DL (ref 0–1.2)
BUN SERPL-MCNC: 44 MG/DL (ref 8–23)
BUN SERPL-MCNC: 44 MG/DL (ref 8–23)
CALCIUM SERPL-MCNC: 10.1 MG/DL (ref 8.6–10.4)
CALCIUM SERPL-MCNC: 10.4 MG/DL (ref 8.6–10.4)
CHLORIDE SERPL-SCNC: 98 MMOL/L (ref 98–107)
CHLORIDE SERPL-SCNC: 99 MMOL/L (ref 98–107)
CO2 SERPL-SCNC: 25 MMOL/L (ref 20–31)
CO2 SERPL-SCNC: 26 MMOL/L (ref 20–31)
CREAT SERPL-MCNC: 1.6 MG/DL (ref 0.7–1.2)
CREAT SERPL-MCNC: 1.7 MG/DL (ref 0.7–1.2)
CREAT UR-MCNC: 35.3 MG/DL (ref 39–259)
EOSINOPHIL # BLD: 0.54 K/UL (ref 0–0.44)
EOSINOPHIL # BLD: 0.54 K/UL (ref 0–0.44)
EOSINOPHILS RELATIVE PERCENT: 9 % (ref 1–4)
EOSINOPHILS RELATIVE PERCENT: 9 % (ref 1–4)
ERYTHROCYTE [DISTWIDTH] IN BLOOD BY AUTOMATED COUNT: 13.2 % (ref 11.8–14.4)
ERYTHROCYTE [DISTWIDTH] IN BLOOD BY AUTOMATED COUNT: 13.2 % (ref 11.8–14.4)
FERRITIN SERPL-MCNC: 33 NG/ML
FOLATE SERPL-MCNC: >40 NG/ML (ref 4.8–24.2)
GFR, ESTIMATED: 41 ML/MIN/1.73M2
GFR, ESTIMATED: 44 ML/MIN/1.73M2
GLUCOSE SERPL-MCNC: 184 MG/DL (ref 74–99)
GLUCOSE SERPL-MCNC: 184 MG/DL (ref 74–99)
HAPTOGLOB SERPL-MCNC: 146 MG/DL (ref 30–200)
HCT VFR BLD AUTO: 43 % (ref 40.7–50.3)
HCT VFR BLD AUTO: 43 % (ref 40.7–50.3)
HGB BLD-MCNC: 14.5 G/DL (ref 13–17)
HGB BLD-MCNC: 14.5 G/DL (ref 13–17)
IMM GRANULOCYTES # BLD AUTO: <0.03 K/UL (ref 0–0.3)
IMM GRANULOCYTES # BLD AUTO: <0.03 K/UL (ref 0–0.3)
IMM GRANULOCYTES NFR BLD: 0 %
IMM GRANULOCYTES NFR BLD: 0 %
IMM RETICS NFR: 10.4 % (ref 2.7–18.3)
IRON SATN MFR SERPL: 21 % (ref 20–55)
IRON SERPL-MCNC: 76 UG/DL (ref 61–157)
LDH SERPL-CCNC: 165 U/L (ref 135–225)
LYMPHOCYTES NFR BLD: 1.34 K/UL (ref 1.1–3.7)
LYMPHOCYTES NFR BLD: 1.34 K/UL (ref 1.1–3.7)
LYMPHOCYTES RELATIVE PERCENT: 21 % (ref 24–43)
LYMPHOCYTES RELATIVE PERCENT: 21 % (ref 24–43)
MAGNESIUM SERPL-MCNC: 2.1 MG/DL (ref 1.6–2.4)
MCH RBC QN AUTO: 32.5 PG (ref 25.2–33.5)
MCH RBC QN AUTO: 32.5 PG (ref 25.2–33.5)
MCHC RBC AUTO-ENTMCNC: 33.7 G/DL (ref 28.4–34.8)
MCHC RBC AUTO-ENTMCNC: 33.7 G/DL (ref 28.4–34.8)
MCV RBC AUTO: 96.4 FL (ref 82.6–102.9)
MCV RBC AUTO: 96.4 FL (ref 82.6–102.9)
MICROALBUMIN UR-MCNC: 15 MG/L (ref 0–20)
MICROALBUMIN/CREAT UR-RTO: 43 MCG/MG CREAT (ref 0–17)
MONOCYTES NFR BLD: 0.68 K/UL (ref 0.1–1.2)
MONOCYTES NFR BLD: 0.68 K/UL (ref 0.1–1.2)
MONOCYTES NFR BLD: 11 % (ref 3–12)
MONOCYTES NFR BLD: 11 % (ref 3–12)
NEUTROPHILS NFR BLD: 58 % (ref 36–65)
NEUTROPHILS NFR BLD: 58 % (ref 36–65)
NEUTS SEG NFR BLD: 3.69 K/UL (ref 1.5–8.1)
NEUTS SEG NFR BLD: 3.69 K/UL (ref 1.5–8.1)
NRBC BLD-RTO: 0 PER 100 WBC
NRBC BLD-RTO: 0 PER 100 WBC
PHOSPHATE SERPL-MCNC: 3.9 MG/DL (ref 2.5–4.5)
PLATELET # BLD AUTO: 126 K/UL (ref 138–453)
PLATELET # BLD AUTO: 126 K/UL (ref 138–453)
PMV BLD AUTO: 11.4 FL (ref 8.1–13.5)
PMV BLD AUTO: 11.4 FL (ref 8.1–13.5)
POTASSIUM SERPL-SCNC: 4.3 MMOL/L (ref 3.7–5.3)
POTASSIUM SERPL-SCNC: 4.4 MMOL/L (ref 3.7–5.3)
PROT SERPL-MCNC: 7.8 G/DL (ref 6.6–8.7)
PTH-INTACT SERPL-MCNC: 50 PG/ML (ref 15–65)
RBC # BLD AUTO: 4.46 M/UL (ref 4.21–5.77)
RBC # BLD AUTO: 4.46 M/UL (ref 4.21–5.77)
RETIC HEMOGLOBIN: 36.3 PG (ref 28.2–35.7)
RETICS # AUTO: 0.09 M/UL (ref 0.03–0.08)
RETICS/RBC NFR AUTO: 2.1 % (ref 0.5–1.9)
SODIUM SERPL-SCNC: 137 MMOL/L (ref 136–145)
SODIUM SERPL-SCNC: 137 MMOL/L (ref 136–145)
TIBC SERPL-MCNC: 360 UG/DL (ref 250–450)
TRANSFERRIN SERPL-MCNC: 287 MG/DL (ref 200–360)
UNSATURATED IRON BINDING CAPACITY: 284 UG/DL (ref 112–347)
VIT B12 SERPL-MCNC: >2000 PG/ML (ref 232–1245)
WBC OTHER # BLD: 6.3 K/UL (ref 3.5–11.3)
WBC OTHER # BLD: 6.3 K/UL (ref 3.5–11.3)

## 2025-02-18 PROCEDURE — 83970 ASSAY OF PARATHORMONE: CPT

## 2025-02-18 PROCEDURE — 82306 VITAMIN D 25 HYDROXY: CPT

## 2025-02-18 PROCEDURE — 83550 IRON BINDING TEST: CPT

## 2025-02-18 PROCEDURE — 84156 ASSAY OF PROTEIN URINE: CPT

## 2025-02-18 PROCEDURE — 36415 COLL VENOUS BLD VENIPUNCTURE: CPT

## 2025-02-18 PROCEDURE — 84100 ASSAY OF PHOSPHORUS: CPT

## 2025-02-18 PROCEDURE — 80048 BASIC METABOLIC PNL TOTAL CA: CPT

## 2025-02-18 PROCEDURE — 83921 ORGANIC ACID SINGLE QUANT: CPT

## 2025-02-18 PROCEDURE — 83615 LACTATE (LD) (LDH) ENZYME: CPT

## 2025-02-18 PROCEDURE — 82607 VITAMIN B-12: CPT

## 2025-02-18 PROCEDURE — 84466 ASSAY OF TRANSFERRIN: CPT

## 2025-02-18 PROCEDURE — 83735 ASSAY OF MAGNESIUM: CPT

## 2025-02-18 PROCEDURE — 81001 URINALYSIS AUTO W/SCOPE: CPT

## 2025-02-18 PROCEDURE — 82570 ASSAY OF URINE CREATININE: CPT

## 2025-02-18 PROCEDURE — 83540 ASSAY OF IRON: CPT

## 2025-02-18 PROCEDURE — 85045 AUTOMATED RETICULOCYTE COUNT: CPT

## 2025-02-18 PROCEDURE — 83010 ASSAY OF HAPTOGLOBIN QUANT: CPT

## 2025-02-18 PROCEDURE — 85025 COMPLETE CBC W/AUTO DIFF WBC: CPT

## 2025-02-18 PROCEDURE — 82728 ASSAY OF FERRITIN: CPT

## 2025-02-18 PROCEDURE — 80053 COMPREHEN METABOLIC PANEL: CPT

## 2025-02-18 PROCEDURE — 82746 ASSAY OF FOLIC ACID SERUM: CPT

## 2025-02-18 PROCEDURE — 82043 UR ALBUMIN QUANTITATIVE: CPT

## 2025-02-19 LAB
BACTERIA URNS QL MICRO: ABNORMAL
BILIRUB UR QL STRIP: NEGATIVE
CASTS #/AREA URNS LPF: ABNORMAL /LPF (ref 0–8)
CLARITY UR: CLEAR
COLOR UR: YELLOW
CREAT UR-MCNC: 34.9 MG/DL (ref 39–259)
EPI CELLS #/AREA URNS HPF: ABNORMAL /HPF (ref 0–5)
GLUCOSE UR STRIP-MCNC: ABNORMAL MG/DL
HGB UR QL STRIP.AUTO: NEGATIVE
KETONES UR STRIP-MCNC: NEGATIVE MG/DL
LEUKOCYTE ESTERASE UR QL STRIP: NEGATIVE
NITRITE UR QL STRIP: NEGATIVE
PH UR STRIP: 6 [PH] (ref 5–8)
PROT UR STRIP-MCNC: NEGATIVE MG/DL
RBC #/AREA URNS HPF: ABNORMAL /HPF (ref 0–4)
SP GR UR STRIP: 1.02 (ref 1–1.03)
SURGICAL PATHOLOGY REPORT: NORMAL
TOTAL PROTEIN, URINE: 7 MG/DL
UROBILINOGEN UR STRIP-ACNC: NORMAL EU/DL (ref 0–1)
WBC #/AREA URNS HPF: ABNORMAL /HPF (ref 0–5)

## 2025-02-20 LAB — PATH REV BLD -IMP: NORMAL

## 2025-02-21 LAB — METHYLMALONATE SERPL-SCNC: 0.2 UMOL/L (ref 0–0.4)

## 2025-04-02 ENCOUNTER — TRANSCRIBE ORDERS (OUTPATIENT)
Dept: ADMINISTRATIVE | Age: 75
End: 2025-04-02

## 2025-04-02 DIAGNOSIS — I82.462 ACUTE EMBOLISM AND THROMBOSIS OF LEFT CALF MUSCLE VEIN (HCC): Primary | ICD-10-CM

## 2025-04-03 ENCOUNTER — HOSPITAL ENCOUNTER (OUTPATIENT)
Dept: ULTRASOUND IMAGING | Facility: CLINIC | Age: 75
Discharge: HOME | End: 2025-04-05
Attending: INTERNAL MEDICINE
Payer: MEDICARE

## 2025-04-03 ENCOUNTER — HOSPITAL ENCOUNTER (OUTPATIENT)
Facility: CLINIC | Age: 75
Discharge: HOME | End: 2025-04-05
Attending: INTERNAL MEDICINE
Payer: MEDICARE

## 2025-04-03 ENCOUNTER — HOSPITAL ENCOUNTER (OUTPATIENT)
Dept: GENERAL RADIOLOGY | Facility: CLINIC | Age: 75
Discharge: HOME | End: 2025-04-05
Attending: INTERNAL MEDICINE
Payer: MEDICARE

## 2025-04-03 DIAGNOSIS — I82.462 ACUTE EMBOLISM AND THROMBOSIS OF LEFT CALF MUSCLE VEIN (HCC): ICD-10-CM
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
## 2025-04-03 PROCEDURE — 73562 X-RAY EXAM OF KNEE 3: CPT

## 2025-04-03 PROCEDURE — 93971 EXTREMITY STUDY: CPT

## 2025-04-17 ENCOUNTER — HOSPITAL ENCOUNTER (OUTPATIENT)
Dept: GENERAL RADIOLOGY | Facility: CLINIC | Age: 75
Discharge: HOME | End: 2025-04-19
Payer: MEDICARE

## 2025-04-17 PROCEDURE — 73564 X-RAY EXAM KNEE 4 OR MORE: CPT

## 2025-04-19 SDOH — HEALTH STABILITY: PHYSICAL HEALTH: ON AVERAGE, HOW MANY DAYS PER WEEK DO YOU ENGAGE IN MODERATE TO STRENUOUS EXERCISE (LIKE A BRISK WALK)?: 0 DAYS

## 2025-04-19 SDOH — HEALTH STABILITY: PHYSICAL HEALTH: ON AVERAGE, HOW MANY MINUTES DO YOU ENGAGE IN EXERCISE AT THIS LEVEL?: 150+ MIN

## 2025-04-22 ENCOUNTER — OFFICE VISIT (OUTPATIENT)
Age: 75
End: 2025-04-22
Payer: MEDICARE

## 2025-04-22 VITALS — BODY MASS INDEX: 26.48 KG/M2 | HEIGHT: 70 IN | WEIGHT: 185 LBS

## 2025-04-22 DIAGNOSIS — G56.03 BILATERAL CARPAL TUNNEL SYNDROME: ICD-10-CM

## 2025-04-22 DIAGNOSIS — M54.12 CERVICAL RADICULOPATHY: ICD-10-CM

## 2025-04-22 DIAGNOSIS — M17.12 OSTEOARTHRITIS OF LEFT KNEE, UNSPECIFIED OSTEOARTHRITIS TYPE: ICD-10-CM

## 2025-04-22 DIAGNOSIS — R20.2 NUMBNESS AND TINGLING OF HAND: Primary | ICD-10-CM

## 2025-04-22 DIAGNOSIS — R20.0 NUMBNESS AND TINGLING OF HAND: Primary | ICD-10-CM

## 2025-04-22 DIAGNOSIS — M17.11 OSTEOARTHRITIS OF RIGHT KNEE, UNSPECIFIED OSTEOARTHRITIS TYPE: ICD-10-CM

## 2025-04-22 PROCEDURE — 1036F TOBACCO NON-USER: CPT | Performed by: ORTHOPAEDIC SURGERY

## 2025-04-22 PROCEDURE — 1159F MED LIST DOCD IN RCRD: CPT | Performed by: ORTHOPAEDIC SURGERY

## 2025-04-22 PROCEDURE — G8427 DOCREV CUR MEDS BY ELIG CLIN: HCPCS | Performed by: ORTHOPAEDIC SURGERY

## 2025-04-22 PROCEDURE — G8417 CALC BMI ABV UP PARAM F/U: HCPCS | Performed by: ORTHOPAEDIC SURGERY

## 2025-04-22 PROCEDURE — 99214 OFFICE O/P EST MOD 30 MIN: CPT | Performed by: ORTHOPAEDIC SURGERY

## 2025-04-22 PROCEDURE — 1123F ACP DISCUSS/DSCN MKR DOCD: CPT | Performed by: ORTHOPAEDIC SURGERY

## 2025-04-23 NOTE — PROGRESS NOTES
[facility] Orthopedics & Sports Medicine      [facility] PHYSICIANS [facility] ORTHOPAEDICS AND SPORTS MEDICINE  [facility]  [place] [address]  Dept: [phone]  Dept Fax: [phone]    Chief Compliant:  Chief Complaint   Patient presents with    Knee Pain        History of Present Illness:  4/22/25:This is a pleasant 74 y.o. male who has multiple chief complaints today.  First he notes radiculopathy in the left arm.  He notes numbness and tingling in the median nerve distribution of both hands.  This is exacerbated at night.  He status post prior bilateral carpal tunnel release surgery many years ago.  The symptoms are returning.  He tripped and fell landing on his bilateral knees last month and has had worsening pain in both knees.  He has been treated for bilateral knee osteoarthritis in the past.  He had a Doppler which was negative for DVT.    Physical Exam: Left hand has a well-healed carpal tunnel scar.  He has a positive Tinel's and positive Durkan's at the carpal tunnel.  He has some thenar atrophy, good thumb opposition strength of the small finger.  The right hand has well-healed carpal tunnel scar, positive Tinel's and positive Durkan's at the carpal tunnel.  There is no significant thenar atrophy, good thumb opposition strength.    Bilateral knees have good range of motion with patellofemoral crepitus and joint line tenderness no instability with varus or valgus stress.  He has an antalgic gait pattern.    Imaging: Prior x-rays of the bilateral knees were reviewed which show significant arthritic changes with joint space narrowing subchondral cyst subchondral sclerosis and osteophyte formation.      Assessment and Plan:    This is a pleasant 74 y.o. male who has bilateral recurrent carpal tunnel syndrome.  We will get an EMG to evaluate this versus cervical radiculopathy as he does have a history of cervical spine fusion and symptoms consistent with

## 2025-05-05 ENCOUNTER — HOSPITAL ENCOUNTER (OUTPATIENT)
Dept: PHYSICAL THERAPY | Facility: CLINIC | Age: 75
Setting detail: THERAPIES SERIES
Discharge: HOME | End: 2025-05-05
Attending: ORTHOPAEDIC SURGERY
Payer: MEDICARE

## 2025-05-05 PROCEDURE — 97161 PT EVAL LOW COMPLEX 20 MIN: CPT

## 2025-05-05 PROCEDURE — 97110 THERAPEUTIC EXERCISES: CPT

## 2025-05-05 NOTE — CONSULTS
Achievement:  [x] No  [] Yes:  Domestic Concerns:  [x] No  [] Yes:    Pt. Education:  [x] Plans/Goals, Risks/Benefits discussed  [x] Home exercise program    Method of Education: [x] Verbal  [x] Demo  [x] Written    Access Code: [email or web address]  URL: [email or web address]  Date: 05/05/2025  Prepared by: Roxy Ward    Exercises  - Supine Active Straight Leg Raise  - 1 x daily - 7 x weekly - 3 sets - 10 reps  - Modified [place] Stretch  - 1 x daily - 7 x weekly - 3 sets - 60 sec hold  - Seated Hamstring Stretch  - 1 x daily - 7 x weekly - 3 sets - 30 sec hold  - Supine Piriformis Stretch with Foot on Ground  - 1 x daily - 7 x weekly - 3 sets - 30 sec hold  - Hooklying Gluteal Sets  - 1 x daily - 7 x weekly - 3 sets - 10 reps    Comprehension of Education:  [x] Verbalizes understanding.  [x] Demonstrates understanding.  [x] Needs Review.  [] Demonstrates/verbalizes understanding of HEP/Ed previously given.    Treatment Plan:  [x] Therapeutic Exercise   70582  [] Iontophoresis: 4 mg/mL Dexamethasone Sodium Phosphate  mAmin  97839   [x] Therapeutic Activity  98868 [] Vasopneumatic cold with compression  85460    [x] Gait Training   71902 [] Ultrasound   42029   [] Neuromuscular Re-education  16355 [] Electrical Stimulation Unattended  97624   [x] Manual Therapy  63281 [] Electrical Stimulation Attended  61908   [x] Instruction in HEP  [] Lumbar/Cervical Traction  23455   [] Aquatic Therapy   89034 [x] Cold/hotpack    [] Massage   48788      [] Dry Needling, 1 or 2 muscles  08853   [] Biofeedback, first 15 minutes   03716  [] Biofeedback, additional 15 minutes   49068 [] Dry Needling, 3 or more muscles  20561     []  Medication allergies reviewed for use of    Dexamethasone Sodium Phosphate 4mg/ml     with iontophoresis treatments.   Pt is not allergic.    Frequency:  2 x/week for 20 visits      Today’s Treatment:  Modalities:   Precautions: Standard   Exercise    R Knee Reps/ Time Weight/ Level Comments

## 2025-05-07 ENCOUNTER — HOSPITAL ENCOUNTER (OUTPATIENT)
Dept: PHYSICAL THERAPY | Facility: CLINIC | Age: 75
Setting detail: THERAPIES SERIES
Discharge: HOME | End: 2025-05-07
Attending: ORTHOPAEDIC SURGERY
Payer: MEDICARE

## 2025-05-07 PROCEDURE — 97110 THERAPEUTIC EXERCISES: CPT

## 2025-05-07 NOTE — FLOWSHEET NOTE
[] [facility]  Outpatient Rehabilitation &  Therapy  [address]  [phone]  [phone] [] [facility]  Outpatient Rehabilitation &  Therapy  [address]  P: [phone]  F: [phone] [] [facility]  Outpatient Rehabilitation &  Therapy  [address]  P: [phone]  F: [phone] [] [facility]  Outpatient Rehabilitation &  Therapy  [address]  [phone]  [phone] [x] [facility]  Outpatient Rehabilitation &  Therapy  [address]   P: [phone]  F: [phone]  [] [facility]  Outpatient Rehabilitation &  Therapy  [address]  P: [phone]  F: [phone] [] [facility]  Outpatient Rehabilitation &  Therapy  [address]  P: [phone]  F: [phone] [] [facility]  Outpatient Rehabilitation &  Therapy  [address]  P: [phone]  F: [phone] [] [facility]  Outpatient Rehabilitation &  Therapy  [address]  P: [phone]  F: [phone]  [] [facility] Outpatient Rehabilitation &  Therapy  [address]  P: [phone]  F: [phone]     Physical Therapy Daily Treatment Note    Date:  2025  Patient Name:  Kilo Agrawal MD    :  1950  MRN: [id number]  Physician: Dr. Ismael Murray                                               Insurance: Medicare (secondary MMO, vs BMN)  Medical Diagnosis: Osteoarthritis of left knee, unspecified osteoarthritis type (M17.12)  Osteoarthritis of right knee, unspecified osteoarthritis type (M17.11)  Rehab Codes: M62.81 , R26.89 , M25.561 , M25.562 , M54.5   Onset date: 3/15/25                                         Next 's appt.: --  Visit# / total visits: ; Progress note for Medicare patient due at visit 10     Cancels/No

## 2025-05-09 ENCOUNTER — HOSPITAL ENCOUNTER (OUTPATIENT)
Facility: CLINIC | Age: 75
Discharge: HOME | End: 2025-05-09
Payer: MEDICARE

## 2025-05-09 PROCEDURE — 87086 URINE CULTURE/COLONY COUNT: CPT

## 2025-05-09 PROCEDURE — 81001 URINALYSIS AUTO W/SCOPE: CPT

## 2025-05-10 ENCOUNTER — HOSPITAL ENCOUNTER (OUTPATIENT)
Facility: CLINIC | Age: 75
Discharge: HOME | End: 2025-05-10
Payer: MEDICARE

## 2025-05-10 LAB
ALBUMIN SERPL-MCNC: 4.2 G/DL (ref 3.5–5.2)
ALBUMIN/GLOB SERPL: 1.4 {RATIO} (ref 1–2.5)
ALP SERPL-CCNC: 65 U/L (ref 40–129)
ALT SERPL-CCNC: 17 U/L (ref 10–50)
ANION GAP SERPL CALCULATED.3IONS-SCNC: 10 MMOL/L (ref 9–16)
AST SERPL-CCNC: 14 U/L (ref 10–50)
BACTERIA URNS QL MICRO: NORMAL
BASOPHILS # BLD: 0.05 K/UL (ref 0–0.2)
BASOPHILS NFR BLD: 1 % (ref 0–2)
BILIRUB SERPL-MCNC: 0.6 MG/DL (ref 0–1.2)
BILIRUB UR QL STRIP: NEGATIVE
BUN SERPL-MCNC: 58 MG/DL (ref 8–23)
CALCIUM SERPL-MCNC: 9.9 MG/DL (ref 8.6–10.4)
CASTS #/AREA URNS LPF: NORMAL /LPF (ref 0–8)
CHLORIDE SERPL-SCNC: 99 MMOL/L (ref 98–107)
CK SERPL-CCNC: 49 U/L (ref 39–308)
CLARITY UR: ABNORMAL
CO2 SERPL-SCNC: 24 MMOL/L (ref 20–31)
COLOR UR: YELLOW
CREAT SERPL-MCNC: 2.1 MG/DL (ref 0.7–1.2)
EOSINOPHIL # BLD: 0.47 K/UL (ref 0–0.44)
EOSINOPHILS RELATIVE PERCENT: 6 % (ref 1–4)
EPI CELLS #/AREA URNS HPF: NORMAL /HPF (ref 0–5)
ERYTHROCYTE [DISTWIDTH] IN BLOOD BY AUTOMATED COUNT: 13.7 % (ref 11.8–14.4)
GFR, ESTIMATED: 32 ML/MIN/1.73M2
GLUCOSE SERPL-MCNC: 317 MG/DL (ref 74–99)
GLUCOSE UR STRIP-MCNC: ABNORMAL MG/DL
HAPTOGLOB SERPL-MCNC: 130 MG/DL (ref 30–200)
HCT VFR BLD AUTO: 39.2 % (ref 40.7–50.3)
HGB BLD-MCNC: 13.1 G/DL (ref 13–17)
HGB UR QL STRIP.AUTO: ABNORMAL
IMM GRANULOCYTES # BLD AUTO: <0.03 K/UL (ref 0–0.3)
IMM GRANULOCYTES NFR BLD: 0 %
KETONES UR STRIP-MCNC: NEGATIVE MG/DL
LDH SERPL-CCNC: 167 U/L (ref 135–225)
LEUKOCYTE ESTERASE UR QL STRIP: ABNORMAL
LYMPHOCYTES NFR BLD: 1.66 K/UL (ref 1.1–3.7)
LYMPHOCYTES RELATIVE PERCENT: 21 % (ref 24–43)
MCH RBC QN AUTO: 32.7 PG (ref 25.2–33.5)
MCHC RBC AUTO-ENTMCNC: 33.4 G/DL (ref 28.4–34.8)
MCV RBC AUTO: 97.8 FL (ref 82.6–102.9)
MONOCYTES NFR BLD: 0.89 K/UL (ref 0.1–1.2)
MONOCYTES NFR BLD: 12 % (ref 3–12)
MYOGLOBIN SERPL-MCNC: 67 NG/ML (ref 28–72)
NEUTROPHILS NFR BLD: 60 % (ref 36–65)
NEUTS SEG NFR BLD: 4.65 K/UL (ref 1.5–8.1)
NITRITE UR QL STRIP: NEGATIVE
NRBC BLD-RTO: 0 PER 100 WBC
PH UR STRIP: 6 [PH] (ref 5–8)
PLATELET # BLD AUTO: 137 K/UL (ref 138–453)
PMV BLD AUTO: 11.1 FL (ref 8.1–13.5)
POTASSIUM SERPL-SCNC: 4.5 MMOL/L (ref 3.7–5.3)
PROT SERPL-MCNC: 7.1 G/DL (ref 6.6–8.7)
PROT UR STRIP-MCNC: ABNORMAL MG/DL
RBC # BLD AUTO: 4.01 M/UL (ref 4.21–5.77)
RBC #/AREA URNS HPF: NORMAL /HPF (ref 0–4)
SODIUM SERPL-SCNC: 133 MMOL/L (ref 136–145)
SP GR UR STRIP: 1.02 (ref 1–1.03)
UROBILINOGEN UR STRIP-ACNC: NORMAL EU/DL (ref 0–1)
WBC #/AREA URNS HPF: NORMAL /HPF (ref 0–5)
WBC OTHER # BLD: 7.7 K/UL (ref 3.5–11.3)

## 2025-05-10 PROCEDURE — 83874 ASSAY OF MYOGLOBIN: CPT

## 2025-05-10 PROCEDURE — 83615 LACTATE (LD) (LDH) ENZYME: CPT

## 2025-05-10 PROCEDURE — 80053 COMPREHEN METABOLIC PANEL: CPT

## 2025-05-10 PROCEDURE — 85025 COMPLETE CBC W/AUTO DIFF WBC: CPT

## 2025-05-10 PROCEDURE — 83010 ASSAY OF HAPTOGLOBIN QUANT: CPT

## 2025-05-10 PROCEDURE — 36415 COLL VENOUS BLD VENIPUNCTURE: CPT

## 2025-05-10 PROCEDURE — 82550 ASSAY OF CK (CPK): CPT

## 2025-05-11 LAB
MICROORGANISM SPEC CULT: NORMAL
SPECIMEN DESCRIPTION: NORMAL

## 2025-05-12 ENCOUNTER — HOSPITAL ENCOUNTER (OUTPATIENT)
Dept: PHYSICAL THERAPY | Facility: CLINIC | Age: 75
Setting detail: THERAPIES SERIES
Discharge: HOME | End: 2025-05-12
Attending: ORTHOPAEDIC SURGERY
Payer: MEDICARE

## 2025-05-12 LAB — MYOGLOBIN UR-MCNC: <1 MG/L (ref 0–1)

## 2025-05-12 PROCEDURE — 97110 THERAPEUTIC EXERCISES: CPT

## 2025-05-12 NOTE — FLOWSHEET NOTE
- 1 x daily - 7 x weekly - 3 sets - 10 reps - 5-10 sec hold    Comprehension of Education:  [x] Verbalizes understanding.  [x] Demonstrates understanding.  [x] Needs review.  [] Demonstrates/verbalizes HEP/Ed previously given.     Plan: [x] Continue current frequency toward long and short term goals.    [x] Specific Instructions for subsequent treatments: see above         Time In: 9:55a               Time Out: 10:45a      Electronically signed by:  CHERIE PARRISH PTA

## 2025-05-14 ENCOUNTER — TRANSCRIBE ORDERS (OUTPATIENT)
Dept: ADMINISTRATIVE | Age: 75
End: 2025-05-14

## 2025-05-14 DIAGNOSIS — R31.0 GROSS HEMATURIA: Primary | ICD-10-CM

## 2025-05-15 ENCOUNTER — HOSPITAL ENCOUNTER (OUTPATIENT)
Dept: PHYSICAL THERAPY | Facility: CLINIC | Age: 75
Setting detail: THERAPIES SERIES
Discharge: HOME | End: 2025-05-15
Attending: ORTHOPAEDIC SURGERY
Payer: MEDICARE

## 2025-05-15 PROCEDURE — 97110 THERAPEUTIC EXERCISES: CPT

## 2025-05-15 NOTE — FLOWSHEET NOTE
[x] [facility]  Outpatient Rehabilitation &  Therapy  [address]   P: [phone]  F: [phone]      Physical Therapy Daily Treatment Note    Date:  5/15/2025  Patient Name:  Kilo Agrawal    :  1950  MRN: [id number]  Physician: Dr. Ismael Murray                                               Insurance: Medicare (secondary MMO, vs BMN)  Medical Diagnosis: Osteoarthritis of left knee, unspecified osteoarthritis type (M17.12)  Osteoarthritis of right knee, unspecified osteoarthritis type (M17.11)  Rehab Codes: M62.81 , R26.89 , M25.561 , M25.562 , M54.5   Onset date: 3/15/25                                         Next 's appt.: --    Visit# / total visits: ; Progress note for Medicare patient due at visit 10     Cancels/No Shows: 0    Subjective:    Pain:  [x] Yes  [] No Location: low back  Pain Rating: (0-10 scale) not rated/10  Pain altered Tx:  [x] No  [] Yes  Action:  Comments: Patient arrived noting has been compliant with HEP. Notes supine hip flexor stretch continues to bother low back.    Objective:  Modalities:   Precautions [x] No  [] Yes:   Exercise     R Knee Reps/ Time Weight/ Level Comments             NuStep  10'  L2               Calf Stretch Slantboard 3x30\"       Hamstring Stretch  3x30\"       Hip flexor stretch stool 3x30\"               Quad Set          SLR  2x10   Contralateral knee bent    Hip flexor stretch     Supine - with quad stretch   Piriformis stretch  3x30\"       Prone gluteal sets 20x5\"       Clamshells  x20  [place]     Bridges  2x10       Sidelying hip abduction  2x10                 4-way hip Tband   x15 ea   Green     Total Gym Squats / HR   x15 ea   L20     Lunges  x15                                   Specific Instructions for next treatment: progress strength program per tolerance        Treatment Charges: Mins Units   []  Modalities       [x]  Ther Exercise 40 3   []  Neuromuscular Re-ed     []  Gait Training     []  Manual

## 2025-05-16 ENCOUNTER — HOSPITAL ENCOUNTER (OUTPATIENT)
Dept: CT IMAGING | Facility: CLINIC | Age: 75
Discharge: HOME | End: 2025-05-18
Attending: UROLOGY
Payer: MEDICARE

## 2025-05-16 DIAGNOSIS — R31.0 GROSS HEMATURIA: ICD-10-CM

## 2025-05-16 PROCEDURE — 74176 CT ABD & PELVIS W/O CONTRAST: CPT

## 2025-05-19 ENCOUNTER — HOSPITAL ENCOUNTER (OUTPATIENT)
Dept: PHYSICAL THERAPY | Facility: CLINIC | Age: 75
Setting detail: THERAPIES SERIES
Discharge: HOME | End: 2025-05-19
Attending: ORTHOPAEDIC SURGERY
Payer: MEDICARE

## 2025-05-19 PROCEDURE — 97110 THERAPEUTIC EXERCISES: CPT

## 2025-05-19 NOTE — FLOWSHEET NOTE
[x] [facility]  Outpatient Rehabilitation &  Therapy  [address]   P: [phone]  F: [phone]      Physical Therapy Daily Treatment Note    Date:  2025  Patient Name:  Kilo Agrawal    :  1950  MRN: [id number]  Physician: Dr. Ismael Murray                                               Insurance: Medicare (secondary MMO, vs BMN)  Medical Diagnosis: Osteoarthritis of left knee, unspecified osteoarthritis type (M17.12)  Osteoarthritis of right knee, unspecified osteoarthritis type (M17.11)  Rehab Codes: M62.81 , R26.89 , M25.561 , M25.562 , M54.5   Onset date: 3/15/25                                         Next 's appt.: --    Visit# / total visits: ; Progress note for Medicare patient due at visit 10     Cancels/No Shows: 0    Subjective:    Pain:  [x] Yes  [] No Location: low back  Pain Rating: (0-10 scale) not rated/10  Pain altered Tx:  [x] No  [] Yes  Action:  Comments: Patient arrived noting minimal issues this date.    Objective:  Modalities:   Precautions [x] No  [] Yes:   Exercise     R Knee Reps/ Time Weight/ Level Comments             NuStep   10'  L2               Calf Stretch Slantboard  3x30\"       Hamstring Stretch   3x30\"       Hip flexor stretch stool  3x30\"               SLR   2x10   Contralateral knee bent    Piriformis stretch   3x30\"       Clamshells   x20  [place]     Bridges   2x10       Sidelying hip abduction   2x10       Squat to mat table  2x10               4-way hip Tband   x15 ea   Green     Total Gym Squats / HR   x15 ea   L20     Lunges  x15      Step ups  2x10  6\"     Step downs  x10  4\"                   Specific Instructions for next treatment: progress strength program per tolerance        Treatment Charges: Mins Units   []  Modalities       [x]  Ther Exercise 40 3   []  Neuromuscular Re-ed     []  Gait Training     []  Manual Therapy     []  Ther Activities     []  Aquatics     []  Vasocompression     []  Cervical Traction

## 2025-05-20 ENCOUNTER — HOSPITAL ENCOUNTER (OUTPATIENT)
Dept: PREADMISSION TESTING | Age: 75
Discharge: HOME | End: 2025-05-24

## 2025-05-20 VITALS — WEIGHT: 183 LBS | HEIGHT: 70 IN | BODY MASS INDEX: 26.2 KG/M2

## 2025-05-20 DIAGNOSIS — Z79.4 TYPE 2 DIABETES MELLITUS WITHOUT COMPLICATION, WITH LONG-TERM CURRENT USE OF INSULIN (HCC): Primary | ICD-10-CM

## 2025-05-20 DIAGNOSIS — E11.9 TYPE 2 DIABETES MELLITUS WITHOUT COMPLICATION, WITH LONG-TERM CURRENT USE OF INSULIN (HCC): Primary | ICD-10-CM

## 2025-05-20 DIAGNOSIS — I10 PRIMARY HYPERTENSION: ICD-10-CM

## 2025-05-20 LAB — UROTHELIAL CANCER DETECTION: NORMAL

## 2025-05-20 NOTE — PROGRESS NOTES
Dr. Hughes, anesthesia, was contacted and informed of patient's history, lab work and planned surgery.  Orders received and no clearance required. However, Dr. Hughes is requesting a BMP be drawn the morning of surgery, this order was placed in EPIC by writer.

## 2025-05-20 NOTE — PROGRESS NOTES
Pre-op Instructions For Out-Patient Surgery    Medication Instructions:  Please stop herbs and any supplements now (includes vitamins and minerals).    For these medications:  Dulaglutide (Trulicity), Exenatide (Byetta and Bydureon, Liraglutide (Victoza), Lixisenatide (Adlyxin), Semaglutide (Ozempic and Rybelsus), Tirzepatide (Mounjaro, Zepbound, Wegovy)- Stop 1 week prior if taking weekly or 1 day prior if taking every 12 hours or daily.     Please contact your surgeon and prescribing physician for pre-op instructions for any blood thinners. Celebrex and Plavix     If you have inhalers/aerosol treatments at home, please use them the morning of your surgery and bring the inhalers with you to the hospital.    Please take the following medications the morning of your surgery with a sip of water:    Isosorbide, Hydralazine, Carvedilol, Famotidine, Omeprazole, and Dr. Agrawal would like to take his Niacin and he will adjust his 5/29/25 night dose of insulin.     Surgery Instructions:  After midnight before surgery:  Do not eat or drink anything, including water, mints, gum, and hard candy.  You may brush your teeth without swallowing.  No smoking, chewing tobacco, or street drugs.    Please shower or bathe before surgery.       Please do not wear any cologne, lotion, powder, deodorant, jewelry, piercings, perfume, makeup, nail polish, hair accessories, or hair spray on the day of surgery.  Wear loose comfortable clothing.    Leave your valuables at home but bring a payment source for any after-surgery prescriptions you plan to fill at [facility].  Bring a storage case for any glasses/contacts.    An adult who is responsible for you MUST remain the in the hospital and drive you home and should be with you for the first 24 hours after surgery.     If having out-patient knee and foot surgeries, please arrange for planned crutches, walker, or wheelchair before arriving to the

## 2025-05-22 ENCOUNTER — HOSPITAL ENCOUNTER (OUTPATIENT)
Dept: PHYSICAL THERAPY | Facility: CLINIC | Age: 75
Setting detail: THERAPIES SERIES
Discharge: HOME | End: 2025-05-22
Attending: ORTHOPAEDIC SURGERY
Payer: MEDICARE

## 2025-05-22 PROCEDURE — 97110 THERAPEUTIC EXERCISES: CPT

## 2025-05-22 NOTE — FLOWSHEET NOTE
[x] [facility]  Outpatient Rehabilitation &  Therapy  [address]   P: [phone]  F: [phone]      Physical Therapy Daily Treatment Note    Date:  2025  Patient Name:  Kilo Agrawal    :  1950  MRN: [id number]  Physician: Dr. Ismael Murray                                               Insurance: Medicare (secondary MMO, vs BMN)  Medical Diagnosis: Osteoarthritis of left knee, unspecified osteoarthritis type (M17.12)  Osteoarthritis of right knee, unspecified osteoarthritis type (M17.11)  Rehab Codes: M62.81 , R26.89 , M25.561 , M25.562 , M54.5   Onset date: 3/15/25                                         Next 's appt.: --    Visit# / total visits: ; Progress note for Medicare patient due at visit 10     Cancels/No Shows: 0    Subjective:    Pain:  [x] Yes  [] No Location: low back  Pain Rating: (0-10 scale) not rated/10  Pain altered Tx:  [x] No  [] Yes  Action:  Comments: Patient arrived noting increased stiffness from moving around a stereo system.    Objective:  Modalities:   Precautions [x] No  [] Yes:   Exercise     R Knee Reps/ Time Weight/ Level Comments             NuStep   10'  L2               Calf Stretch Slantboard  3x30\"       Hamstring Stretch   3x30\"       Hip flexor stretch stool  3x30\"               SLR   2x10   Contralateral knee bent    Piriformis stretch   3x30\"       Clamshells   x20  [place]     Bridges   2x10       Sidelying hip abduction   2x10       Squat to mat table  2x10               4-way hip Tband   x15 ea   Green     Total Gym Squats / HR   x15 ea   L20     Lunges  x15      Step ups  2x10  6\"     Step downs  x10  4\"                   Specific Instructions for next treatment: progress strength program per tolerance        Treatment Charges: Mins Units   []  Modalities       [x]  Ther Exercise 40 3   []  Neuromuscular Re-ed     []  Gait Training     []  Manual Therapy     []  Ther Activities     []  Aquatics     []

## 2025-05-27 ENCOUNTER — HOSPITAL ENCOUNTER (OUTPATIENT)
Dept: PHYSICAL THERAPY | Facility: CLINIC | Age: 75
Setting detail: THERAPIES SERIES
Discharge: HOME | End: 2025-05-27
Attending: ORTHOPAEDIC SURGERY
Payer: MEDICARE

## 2025-05-27 PROCEDURE — 97110 THERAPEUTIC EXERCISES: CPT

## 2025-05-27 NOTE — FLOWSHEET NOTE
7 x weekly - 3 sets - 10 reps  - Prone Gluteal Sets  - 1 x daily - 7 x weekly - 3 sets - 10 reps - 5-10 sec hold    Comprehension of Education:  [x] Verbalizes understanding.  [x] Demonstrates understanding.  [x] Needs review.  [] Demonstrates/verbalizes HEP/Ed previously given.     Plan: [x] Continue current frequency toward long and short term goals.    [x] Specific Instructions for subsequent treatments: see above         Time In: 9:50a               Time Out: 10:45a      Electronically signed by:  Roxy Ward, PT

## 2025-05-29 ENCOUNTER — ANESTHESIA EVENT (OUTPATIENT)
Dept: OPERATING ROOM | Age: 75
End: 2025-05-29
Payer: MEDICARE

## 2025-05-29 ENCOUNTER — HOSPITAL ENCOUNTER (OUTPATIENT)
Dept: PHYSICAL THERAPY | Facility: CLINIC | Age: 75
Setting detail: THERAPIES SERIES
Discharge: HOME | End: 2025-05-29
Attending: ORTHOPAEDIC SURGERY
Payer: MEDICARE

## 2025-05-29 PROCEDURE — 97110 THERAPEUTIC EXERCISES: CPT

## 2025-05-29 ASSESSMENT — ENCOUNTER SYMPTOMS
SHORTNESS OF BREATH: 0
SINUS PRESSURE: 0
ABDOMINAL PAIN: 0
NAUSEA: 0

## 2025-05-29 NOTE — FLOWSHEET NOTE
5-10 sec hold    Comprehension of Education:  [x] Verbalizes understanding.  [x] Demonstrates understanding.  [x] Needs review.  [] Demonstrates/verbalizes HEP/Ed previously given.     Plan: [x] Continue current frequency toward long and short term goals.    [x] Specific Instructions for subsequent treatments: see above         Time In: 9:50a               Time Out: 10:50a      Electronically signed by:  CHERIE PARRISH PTA

## 2025-05-29 NOTE — PRE-PROCEDURE INSTRUCTIONS
No answer, left message ?                             Unable to leave message ?    When were you told to arrive at hospital ?  1085    Do you have a  ?yes    Are you on any blood thinners ?       yes              If yes when did you stop taking ?last Friday    Do you have your prep Rx filled and instruction ?      Nothing to eat the day before , only clear liquids.    Are you experiencing any covid symptoms ? no    Do you have any infections or rash we should be aware of ?no      Do you have the Hibiclens soap to use the night before and the morning of surgery ?    Nothing to eat or drink after midnight, only a sip of water to take any medication instructed to take the night before.yes  Wear comfortable clothing, leave any valuables at home, remove any jewelry and body piercing . yes

## 2025-05-29 NOTE — H&P
tablet by mouth 2 times daily 180 tablet 3    magnesium oxide (MAG-OX) 400 MG tablet Take 2 tablets by mouth 2 times daily      celecoxib (CELEBREX) 100 MG capsule Take 1 capsule by mouth 2 times daily      omeprazole (PRILOSEC) 20 MG capsule Take 2 capsules by mouth daily      tamsulosin (FLOMAX) 0.4 MG capsule Take 1 capsule by mouth 2 times daily      clopidogrel (PLAVIX) 75 MG tablet Take 1 tablet by mouth daily      folic acid (FOLVITE) 400 MCG tablet Take 2 tablets by mouth 3 times daily      vitamin B-12 (CYANOCOBALAMIN) 1000 MCG tablet Take 2.5 tablets by mouth daily      niacin (SLO-NIACIN) 500 MG tablet Take 4 tablets by mouth every morning      Multiple Vitamins-Minerals (THERAPEUTIC MULTIVITAMIN-MINERALS) tablet Take 1 tablet by mouth daily      cetirizine (ZYRTEC) 10 MG tablet Take 1 tablet by mouth daily      insulin glargine (LANTUS) 100 UNIT/ML injection vial Inject 35 Units into the skin nightly      atorvastatin (LIPITOR) 80 MG tablet Take 1 tablet by mouth nightly. 30 tablet 11     Notation: Above medications are not currently reconciled at time of signing this H&P note, to be reconciled in pre-op per RN.     Review of Systems   Constitutional: Negative.    HENT:  Negative for congestion and sinus pressure.    Respiratory:  Negative for shortness of breath.    Cardiovascular:  Negative for leg swelling.   Gastrointestinal:  Negative for abdominal pain and nausea.   Genitourinary:         See HPI   Skin: Negative.    Neurological:  Negative for dizziness.   Psychiatric/Behavioral:  Negative for sleep disturbance.    All other systems reviewed and are negative.        GENERAL PHYSICAL EXAM     Vitals: BP (!) 145/70   Pulse 70   Temp 98.4 °F (36.9 °C) (Infrared)   Resp 16   Ht 1.778 m (5' 10\")   Wt 83 kg (183 lb)   SpO2 98%   BMI 26.26 kg/m²  Body mass index is 26.26 kg/m².     BP Readings from Last 3 Encounters:   05/30/25 (!) 145/70   04/14/25 134/76   12/04/24 (!) 143/74                        Physical Exam  Constitutional:       General: He is not in acute distress.  HENT:      Head: Normocephalic.      Right Ear: External ear normal.      Left Ear: External ear normal.      Nose: Nose normal.      Mouth/Throat:      Pharynx: No posterior oropharyngeal erythema.   Eyes:      General:         Right eye: No discharge.         Left eye: No discharge.   Cardiovascular:      Rate and Rhythm: Normal rate and regular rhythm.      Heart sounds: Normal heart sounds.   Pulmonary:      Breath sounds: Normal breath sounds.   Abdominal:      General: Bowel sounds are normal.      Tenderness: There is no abdominal tenderness. There is no guarding.   Genitourinary:     Comments: No CVA tenderness  Neurological:      Mental Status: He is alert and oriented to person, place, and time.   Psychiatric:         Mood and Affect: Mood normal.        PROVISIONAL DIAGNOSES / SURGERY:      CYSTOSCOPY RETROGRADE PYELOGRAM BILATERAL    Pre-Op Diagnosis Codes:      * Gross hematuria [R31.0]     Patient Active Problem List    Diagnosis Date Noted    Coronary artery disease involving native coronary artery of native heart 10/26/2015    Hypertension 09/05/2015    Hyperlipidemia 09/05/2015    Diabetes (HCC) 09/05/2015    Atypical chest pain 09/05/2015    Shortness of breath 09/05/2015    History of cardiac cath (9/8/2007) 09/05/2015    RCA occlusion (HCC) 09/05/2015    Open wound of hand except fingers with tendon involvement 05/13/2015           DANG MARX - CNP on 5/30/2025 at 1:52 PM

## 2025-05-30 ENCOUNTER — APPOINTMENT (OUTPATIENT)
Dept: GENERAL RADIOLOGY | Age: 75
End: 2025-05-30
Attending: UROLOGY
Payer: MEDICARE

## 2025-05-30 ENCOUNTER — HOSPITAL ENCOUNTER (OUTPATIENT)
Age: 75
Setting detail: OUTPATIENT SURGERY
Discharge: HOME | End: 2025-05-30
Attending: UROLOGY | Admitting: UROLOGY
Payer: MEDICARE

## 2025-05-30 ENCOUNTER — ANESTHESIA (OUTPATIENT)
Dept: OPERATING ROOM | Age: 75
End: 2025-05-30
Payer: MEDICARE

## 2025-05-30 VITALS
WEIGHT: 183 LBS | DIASTOLIC BLOOD PRESSURE: 71 MMHG | SYSTOLIC BLOOD PRESSURE: 141 MMHG | BODY MASS INDEX: 26.2 KG/M2 | RESPIRATION RATE: 20 BRPM | HEIGHT: 70 IN | HEART RATE: 63 BPM | OXYGEN SATURATION: 99 % | TEMPERATURE: 97.7 F

## 2025-05-30 DIAGNOSIS — R31.0 GROSS HEMATURIA: ICD-10-CM

## 2025-05-30 LAB
ANION GAP SERPL CALCULATED.3IONS-SCNC: 11 MMOL/L (ref 9–16)
BUN SERPL-MCNC: 37 MG/DL (ref 8–23)
CALCIUM SERPL-MCNC: 10 MG/DL (ref 8.6–10.4)
CHLORIDE SERPL-SCNC: 102 MMOL/L (ref 98–107)
CO2 SERPL-SCNC: 26 MMOL/L (ref 20–31)
CREAT SERPL-MCNC: 1.9 MG/DL (ref 0.7–1.2)
GFR, ESTIMATED: 36 ML/MIN/1.73M2
GLUCOSE BLD-MCNC: 139 MG/DL (ref 75–110)
GLUCOSE BLD-MCNC: 185 MG/DL (ref 75–110)
GLUCOSE SERPL-MCNC: 201 MG/DL (ref 74–99)
POTASSIUM SERPL-SCNC: 4.2 MMOL/L (ref 3.7–5.3)
SODIUM SERPL-SCNC: 139 MMOL/L (ref 136–145)

## 2025-05-30 PROCEDURE — 88305 TISSUE EXAM BY PATHOLOGIST: CPT

## 2025-05-30 PROCEDURE — C1758 CATHETER, URETERAL: HCPCS | Performed by: UROLOGY

## 2025-05-30 PROCEDURE — 7100000000 HC PACU RECOVERY - FIRST 15 MIN: Performed by: UROLOGY

## 2025-05-30 PROCEDURE — 88342 IMHCHEM/IMCYTCHM 1ST ANTB: CPT

## 2025-05-30 PROCEDURE — 82947 ASSAY GLUCOSE BLOOD QUANT: CPT

## 2025-05-30 PROCEDURE — 3600000002 HC SURGERY LEVEL 2 BASE: Performed by: UROLOGY

## 2025-05-30 PROCEDURE — 7100000010 HC PHASE II RECOVERY - FIRST 15 MIN: Performed by: UROLOGY

## 2025-05-30 PROCEDURE — 7100000011 HC PHASE II RECOVERY - ADDTL 15 MIN: Performed by: UROLOGY

## 2025-05-30 PROCEDURE — 3700000001 HC ADD 15 MINUTES (ANESTHESIA): Performed by: UROLOGY

## 2025-05-30 PROCEDURE — 36415 COLL VENOUS BLD VENIPUNCTURE: CPT

## 2025-05-30 PROCEDURE — 3600000012 HC SURGERY LEVEL 2 ADDTL 15MIN: Performed by: UROLOGY

## 2025-05-30 PROCEDURE — 3700000000 HC ANESTHESIA ATTENDED CARE: Performed by: UROLOGY

## 2025-05-30 PROCEDURE — C1769 GUIDE WIRE: HCPCS | Performed by: UROLOGY

## 2025-05-30 PROCEDURE — 6360000002 HC RX W HCPCS: Performed by: UROLOGY

## 2025-05-30 PROCEDURE — 7100000031 HC ASPR PHASE II RECOVERY - ADDTL 15 MIN: Performed by: UROLOGY

## 2025-05-30 PROCEDURE — 7100000001 HC PACU RECOVERY - ADDTL 15 MIN: Performed by: UROLOGY

## 2025-05-30 PROCEDURE — 88307 TISSUE EXAM BY PATHOLOGIST: CPT

## 2025-05-30 PROCEDURE — 6360000002 HC RX W HCPCS: Performed by: NURSE ANESTHETIST, CERTIFIED REGISTERED

## 2025-05-30 PROCEDURE — 2500000003 HC RX 250 WO HCPCS: Performed by: NURSE ANESTHETIST, CERTIFIED REGISTERED

## 2025-05-30 PROCEDURE — 2580000003 HC RX 258: Performed by: ANESTHESIOLOGY

## 2025-05-30 PROCEDURE — 80048 BASIC METABOLIC PNL TOTAL CA: CPT

## 2025-05-30 PROCEDURE — 2720000010 HC SURG SUPPLY STERILE: Performed by: UROLOGY

## 2025-05-30 PROCEDURE — 7100000030 HC ASPR PHASE II RECOVERY - FIRST 15 MIN: Performed by: UROLOGY

## 2025-05-30 PROCEDURE — 2709999900 HC NON-CHARGEABLE SUPPLY: Performed by: UROLOGY

## 2025-05-30 PROCEDURE — 6370000000 HC RX 637 (ALT 250 FOR IP): Performed by: ANESTHESIOLOGY

## 2025-05-30 PROCEDURE — 6360000004 HC RX CONTRAST MEDICATION: Performed by: UROLOGY

## 2025-05-30 RX ORDER — SODIUM CHLORIDE 9 MG/ML
INJECTION, SOLUTION INTRAVENOUS PRN
Status: DISCONTINUED | OUTPATIENT
Start: 2025-05-30 | End: 2025-05-30 | Stop reason: HOSPADM

## 2025-05-30 RX ORDER — ONDANSETRON 2 MG/ML
4 INJECTION INTRAMUSCULAR; INTRAVENOUS
Status: DISCONTINUED | OUTPATIENT
Start: 2025-05-30 | End: 2025-05-30 | Stop reason: HOSPADM

## 2025-05-30 RX ORDER — IOPAMIDOL 612 MG/ML
INJECTION, SOLUTION INTRAVASCULAR PRN
Status: DISCONTINUED | OUTPATIENT
Start: 2025-05-30 | End: 2025-05-30 | Stop reason: ALTCHOICE

## 2025-05-30 RX ORDER — ROCURONIUM BROMIDE 10 MG/ML
INJECTION, SOLUTION INTRAVENOUS
Status: DISCONTINUED | OUTPATIENT
Start: 2025-05-30 | End: 2025-05-30 | Stop reason: SDUPTHER

## 2025-05-30 RX ORDER — NALOXONE HYDROCHLORIDE 0.4 MG/ML
INJECTION, SOLUTION INTRAMUSCULAR; INTRAVENOUS; SUBCUTANEOUS PRN
Status: DISCONTINUED | OUTPATIENT
Start: 2025-05-30 | End: 2025-05-30 | Stop reason: HOSPADM

## 2025-05-30 RX ORDER — DIPHENHYDRAMINE HYDROCHLORIDE 50 MG/ML
12.5 INJECTION, SOLUTION INTRAMUSCULAR; INTRAVENOUS
Status: DISCONTINUED | OUTPATIENT
Start: 2025-05-30 | End: 2025-05-30 | Stop reason: HOSPADM

## 2025-05-30 RX ORDER — METOCLOPRAMIDE HYDROCHLORIDE 5 MG/ML
10 INJECTION INTRAMUSCULAR; INTRAVENOUS
Status: DISCONTINUED | OUTPATIENT
Start: 2025-05-30 | End: 2025-05-30 | Stop reason: HOSPADM

## 2025-05-30 RX ORDER — EPHEDRINE SULFATE/0.9% NACL/PF 25 MG/5 ML
SYRINGE (ML) INTRAVENOUS
Status: DISCONTINUED | OUTPATIENT
Start: 2025-05-30 | End: 2025-05-30 | Stop reason: SDUPTHER

## 2025-05-30 RX ORDER — ONDANSETRON 2 MG/ML
INJECTION INTRAMUSCULAR; INTRAVENOUS
Status: DISCONTINUED | OUTPATIENT
Start: 2025-05-30 | End: 2025-05-30 | Stop reason: SDUPTHER

## 2025-05-30 RX ORDER — SODIUM CHLORIDE 0.9 % (FLUSH) 0.9 %
5-40 SYRINGE (ML) INJECTION EVERY 12 HOURS SCHEDULED
Status: DISCONTINUED | OUTPATIENT
Start: 2025-05-30 | End: 2025-05-30 | Stop reason: HOSPADM

## 2025-05-30 RX ORDER — SODIUM CHLORIDE 0.9 % (FLUSH) 0.9 %
5-40 SYRINGE (ML) INJECTION PRN
Status: DISCONTINUED | OUTPATIENT
Start: 2025-05-30 | End: 2025-05-30 | Stop reason: HOSPADM

## 2025-05-30 RX ORDER — HYDROCODONE BITARTRATE AND ACETAMINOPHEN 5; 325 MG/1; MG/1
1 TABLET ORAL EVERY 6 HOURS PRN
Qty: 8 TABLET | Refills: 0 | Status: SHIPPED | OUTPATIENT
Start: 2025-05-30 | End: 2025-06-02

## 2025-05-30 RX ORDER — LIDOCAINE HYDROCHLORIDE 10 MG/ML
1 INJECTION, SOLUTION EPIDURAL; INFILTRATION; INTRACAUDAL; PERINEURAL
Status: DISCONTINUED | OUTPATIENT
Start: 2025-05-30 | End: 2025-05-30 | Stop reason: HOSPADM

## 2025-05-30 RX ORDER — FENTANYL CITRATE 0.05 MG/ML
25 INJECTION, SOLUTION INTRAMUSCULAR; INTRAVENOUS EVERY 5 MIN PRN
Status: DISCONTINUED | OUTPATIENT
Start: 2025-05-30 | End: 2025-05-30 | Stop reason: HOSPADM

## 2025-05-30 RX ORDER — SODIUM CHLORIDE 9 MG/ML
INJECTION, SOLUTION INTRAVENOUS CONTINUOUS
Status: DISCONTINUED | OUTPATIENT
Start: 2025-05-30 | End: 2025-05-30 | Stop reason: HOSPADM

## 2025-05-30 RX ORDER — ACETAMINOPHEN 500 MG
500 TABLET ORAL ONCE
Status: COMPLETED | OUTPATIENT
Start: 2025-05-30 | End: 2025-05-30

## 2025-05-30 RX ORDER — FENTANYL CITRATE 50 UG/ML
INJECTION, SOLUTION INTRAMUSCULAR; INTRAVENOUS
Status: DISCONTINUED | OUTPATIENT
Start: 2025-05-30 | End: 2025-05-30 | Stop reason: SDUPTHER

## 2025-05-30 RX ORDER — LIDOCAINE HYDROCHLORIDE 20 MG/ML
INJECTION, SOLUTION EPIDURAL; INFILTRATION; INTRACAUDAL; PERINEURAL
Status: DISCONTINUED | OUTPATIENT
Start: 2025-05-30 | End: 2025-05-30 | Stop reason: SDUPTHER

## 2025-05-30 RX ORDER — PROPOFOL 10 MG/ML
INJECTION, EMULSION INTRAVENOUS
Status: DISCONTINUED | OUTPATIENT
Start: 2025-05-30 | End: 2025-05-30 | Stop reason: SDUPTHER

## 2025-05-30 RX ADMIN — ROCURONIUM BROMIDE 50 MG: 10 INJECTION, SOLUTION INTRAVENOUS at 14:00

## 2025-05-30 RX ADMIN — SODIUM CHLORIDE: 900 INJECTION, SOLUTION INTRAVENOUS at 14:57

## 2025-05-30 RX ADMIN — SODIUM CHLORIDE: 900 INJECTION, SOLUTION INTRAVENOUS at 13:17

## 2025-05-30 RX ADMIN — EPHEDRINE SULFATE 10 MG: 5 INJECTION INTRAVENOUS at 14:43

## 2025-05-30 RX ADMIN — PROPOFOL 150 MG: 10 INJECTION, EMULSION INTRAVENOUS at 14:00

## 2025-05-30 RX ADMIN — EPHEDRINE SULFATE 10 MG: 5 INJECTION INTRAVENOUS at 14:25

## 2025-05-30 RX ADMIN — SUGAMMADEX 200 MG: 100 INJECTION, SOLUTION INTRAVENOUS at 14:49

## 2025-05-30 RX ADMIN — ROCURONIUM BROMIDE 20 MG: 10 INJECTION, SOLUTION INTRAVENOUS at 14:32

## 2025-05-30 RX ADMIN — ACETAMINOPHEN 500 MG: 500 TABLET ORAL at 13:17

## 2025-05-30 RX ADMIN — Medication 2000 MG: at 14:04

## 2025-05-30 RX ADMIN — FENTANYL CITRATE 50 MCG: 50 INJECTION INTRAMUSCULAR; INTRAVENOUS at 14:00

## 2025-05-30 RX ADMIN — LIDOCAINE HYDROCHLORIDE 40 MG: 20 INJECTION, SOLUTION EPIDURAL; INFILTRATION; INTRACAUDAL; PERINEURAL at 14:00

## 2025-05-30 RX ADMIN — ONDANSETRON 4 MG: 2 INJECTION, SOLUTION INTRAMUSCULAR; INTRAVENOUS at 14:49

## 2025-05-30 ASSESSMENT — PAIN DESCRIPTION - LOCATION: LOCATION: PENIS

## 2025-05-30 ASSESSMENT — ENCOUNTER SYMPTOMS: SHORTNESS OF BREATH: 1

## 2025-05-30 ASSESSMENT — PAIN SCALES - GENERAL: PAINLEVEL_OUTOF10: 1

## 2025-05-30 ASSESSMENT — PAIN - FUNCTIONAL ASSESSMENT
PAIN_FUNCTIONAL_ASSESSMENT: 0-10
PAIN_FUNCTIONAL_ASSESSMENT: 0-10
PAIN_FUNCTIONAL_ASSESSMENT: NONE - DENIES PAIN

## 2025-05-30 ASSESSMENT — PAIN DESCRIPTION - DESCRIPTORS
DESCRIPTORS: BURNING
DESCRIPTORS: BURNING

## 2025-05-30 ASSESSMENT — PAIN DESCRIPTION - PAIN TYPE: TYPE: SURGICAL PAIN

## 2025-05-30 NOTE — PROGRESS NOTES
CLINICAL PHARMACY NOTE: MEDS TO BEDS    Total # of Prescriptions Filled: 1   The following medications were delivered to the patient:  Hydrocodone/APAP 5/325mg    Additional Documentation: 5/30/25 4:04pm cara raines at [facility]

## 2025-05-30 NOTE — H&P
History and Physical    Patient:  Kilo Agrawal  MRN: [id number]  YOB: 1950    CHIEF COMPLAINT: Hematuria    HISTORY OF PRESENT ILLNESS:   The patient is a 74 y.o. male who presents with hematuria.  CT was unremarkable.  UroVysion FISH was positive.  He cannot have IV contrast.  He is here for cystoscopy and bilateral retrograde possible bladder biopsy.    Patient's old records, notes and chart reviewed and summarized above.     Past Medical History:    Past Medical History:   Diagnosis Date    Abdominal aortic aneurysm     3.4cm last seen CT abdomen on 5/22/2025    BPH (benign prostatic hyperplasia)     CAD (coronary artery disease)     CKD (chronic kidney disease)     GERD (gastroesophageal reflux disease)     Hyperlipidemia     Hypertension     Osteoarthritis     Thrush 01/01/2015    Type II or unspecified type diabetes mellitus without mention of complication, not stated as uncontrolled        Past Surgical History:    Past Surgical History:   Procedure Laterality Date    ANTERIOR CERVICAL DISCECTOMY W/ FUSION      CARPAL TUNNEL RELEASE      bilateral    COLONOSCOPY      CT BIOPSY BONE MARROW  9/26/2024    CT BONE MARROW BIOPSY 9/26/2024 [facility] CT SCAN    ENDOSCOPY, COLON, DIAGNOSTIC      FINGER TRIGGER RELEASE      right thumb    HERNIA REPAIR      right inguinal hernia    LIPOMA RESECTION      Left axillary.    TONSILLECTOMY AND ADENOIDECTOMY         Medications Prior to Admission:    Prior to Admission medications    Medication Sig Start Date End Date Taking? Authorizing Provider   Dextromethorphan-guaiFENesin (MUCINEX DM PO) Take 1,200 mg by mouth in the morning and at bedtime   Yes Provider, MD Amaury   dapagliflozin (FARXIGA) 5 MG tablet Take 1 tablet by mouth every morning  Patient taking differently: Take 2 tablets by mouth every morning 11/23/22  Yes Steffen Dunn MD   hydrALAZINE (APRESOLINE) 25 MG tablet Take 1 tablet by mouth in the morning, at noon, and at bedtime   Yes  mouth nightly. 2/26/14  Yes Chito Langley DO       Allergies:  Ace inhibitors, Diovan [valsartan], Januvia [sitagliptin], Lisinopril, Peanuts [peanut oil], and Tekturna [aliskiren]    Social History:    Social History     Socioeconomic History    Marital status:      Spouse name: Not on file    Number of children: Not on file    Years of education: Not on file    Highest education level: Not on file   Occupational History    Not on file   Tobacco Use    Smoking status: Never    Smokeless tobacco: Never   Vaping Use    Vaping status: Never Used   Substance and Sexual Activity    Alcohol use: No     Alcohol/week: 0.0 standard drinks of alcohol    Drug use: No    Sexual activity: Never   Other Topics Concern    Not on file   Social History Narrative    Not on file     Social Drivers of Health     Financial Resource Strain: Not on file   Food Insecurity: Not on file   Transportation Needs: Not on file   Physical Activity: Inactive (4/19/2025)    Exercise Vital Sign     Days of Exercise per Week: 0 days     Minutes of Exercise per Session: 150+ min   Stress: Not on file   Social Connections: Not on file   Intimate Partner Violence: Unknown (2/22/2024)    [facility], [facility]    [place] Safety & Environment     Fear of Current or Ex-Partner: Not on file     Emotionally Abused: Not on file     Physically Abused: Not on file     Sexually Abused: Not on file     Physically or Sexually Abused: Not on file   Housing Stability: Not on file       Family History:    Family History   Problem Relation Age of Onset    Heart Disease Mother     Other Mother     Heart Disease Father     Heart Disease Paternal Uncle     Heart Disease Maternal Grandmother     Diabetes Paternal Grandmother     Cancer Paternal Grandfather        REVIEW OF SYSTEMS:  All reviewed and negative except for pertinent ones listed in HPI.    Physical Exam:      Patient Vitals for the past 24 hrs:   BP Temp Temp src

## 2025-05-30 NOTE — ANESTHESIA POSTPROCEDURE EVALUATION
Department of Anesthesiology  Postprocedure Note    Patient: Kilo Agrawal  MRN: [id number]  YOB: 1950  Date of evaluation: 5/30/2025    Procedure Summary       Date: 05/30/25 Room / Location: [place] [address] / [facility]    Anesthesia Start: 1355 Anesthesia Stop: 1503    Procedure: CYSTOSCOPY URETHRAL DILATION RETROGRADE PYELOGRAM BILATERAL BLADDER BIOPSY AND FULGERATION WITH BIOPSY OF BLADDER DIVERTICULUM (Bilateral: Ureter) Diagnosis:       Gross hematuria      (Gross hematuria [R31.0])    Surgeons: Ronen Comer MD Responsible Provider: Tyler Hughes MD    Anesthesia Type: general ASA Status: 3            Anesthesia Type: No value filed.    Bryan Phase I: Bryan Score: 10    Bryan Phase II: Bryan Score: 10    Anesthesia Post Evaluation    Comments: POST- ANESTHESIA EVALUATION       Pt Name: Kilo Agrawal  MRN: [id number]  YOB: 1950  Date of evaluation: 5/30/2025  Time:  4:28 PM      BP (!) 141/71   Pulse 63   Temp 97.7 °F (36.5 °C) (Temporal)   Resp 20   Ht 1.778 m (5' 10\")   Wt 83 kg (183 lb)   SpO2 99%   BMI 26.26 kg/m²      Consciousness Level  Awake  Cardiopulmonary Status  Stable  Pain Adequately Treated YES  Nausea / Vomiting  NO  Adequate Hydration  YES  Anesthesia Related Complications NONE      Electronically signed by Rony Lay MD on 5/30/2025 at 4:28 PM           No notable events documented.

## 2025-05-30 NOTE — OP NOTE
Operative Note      Patient: Kilo Agrawal  YOB: 1950  MRN: [id number]    Date of Procedure: 5/30/2025    Pre-Op Diagnosis Codes:      * Gross hematuria [R31.0]    Post-Op Diagnosis: Same       Procedure(s):  CYSTOSCOPY URETHRAL DILATION RETROGRADE PYELOGRAM BILATERAL BLADDER BIOPSY AND FULGERATION WITH BIOPSY OF BLADDER DIVERTICULUM    Surgeon(s):  Ronen Comer MD    Assistant:   * No surgical staff found *    Anesthesia: General    Estimated Blood Loss (mL): Minimal    Complications: None    Specimens:   ID Type Source Tests Collected by Time Destination   A : BLADDER TUMOR Tissue Bladder SURGICAL PATHOLOGY Ronen Comer MD 5/30/2025 1431    B : BIOPSY FROM BLADDER DIVERTICULUM Tissue Bladder SURGICAL PATHOLOGY Ronen Comer MD 5/30/2025 1439        Implants:  * No implants in log *      Drains: * No LDAs found *    Findings:  Infection Present At Time Of Surgery (PATOS) (choose all levels that have infection present):  No infection present  Other Findings:   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  CT scan without contrast did not show any definitive abnormalities.  He is here today for cystoscopy, with retrograde pyelography.  His UroVysion FISH was positive preop, so plan is for possible bladder biopsy as well.    Detailed Description of Procedure:   Patient was brought back to the operating room the operatively supine position.  Once general esthesia was obtained, he was placed in dorsal thigh position prepped and draped in usual sterile fashion.  A timeout was performed, is prepped identified, and antibiotics were administered.  I attempted to advance a 22 [place] sheath cystoscope into the urethral meatus.  There was some narrowing at the meatus, so Sarmiento sounds were used to sequentially dilate the meatus.  Starting at 18 [place] I sequentially dilated to 24 [place].  I was then able to advance a 22 [place] sheath with a 30 degree scope up the urethra into the  bladder.  The urethra was unremarkable.  The prostatic urethra did show a median lobe indenting into the bladder.  Entering into the bladder the bladder was drained.  The bladder was then refilled and careful cystoscopy was carried out.  Trabeculation was noted throughout the bladder.  Pan cystoscopy demonstrated 1 abnormality on the left lateral wall.  It appeared to be a small tumor under 1 cm.  This area was removed using the cup biopsy forceps.  5 good biopsies of the area were obtained.  No residual tumor was seen.  The area was then thoroughly fulgurated.  Good hemostasis was seen.  I then focused on the retrograde pyelography.  A right retrograde pyelogram was done.  Distal ureter showed J hooking, and there was some difficulty filling the ureter at the level of the mid ureter.  The collecting system though filled nicely without any filling defects hydronephrosis or blunting.  The system drained nicely without any blood.  The left retrograde pyelogram showed no J hooking.  The distal mid and proximal ureter were normal, as was the collecting system.  No filling defects or hydronephrosis were seen.  The left side drained nicely.  I then refocused my attention on the cystoscopy the previous bladder biopsy site was evaluated and good hemostasis was seen I looked into the bladder diverticulum the bladder tight diverticulum showed 1 area that was somewhat erythematous this was fulgurated and there was an area inferior to this that would.  A little nodular roughly 5 mm in size a careful biopsy of this area was taken as well and the biopsy site was thoroughly fulgurated.  Good hemostasis was seen at both biopsy sites.  The remainder the bladder was carefully visualized again was unremarkable.  I was able to advance a wire up the right ureter and straighten out the J hooking.  I then advanced a dual-lumen catheter over that wire into the mid ureter.  A repeat retrograde pyelogram then showed good filling of the area

## 2025-05-30 NOTE — ANESTHESIA PRE PROCEDURE
• Hypertension I10   • Hyperlipidemia E78.5   • Diabetes (HCC) E11.9   • Atypical chest pain R07.89   • Shortness of breath R06.02   • History of cardiac cath (9/8/2007) Z98.890   • RCA occlusion (HCC) I24.0   • Coronary artery disease involving native coronary artery of native heart I25.10       Past Medical History:        Diagnosis Date   • Abdominal aortic aneurysm     3.4cm last seen CT abdomen on 5/22/2025   • BPH (benign prostatic hyperplasia)    • CAD (coronary artery disease)    • CKD (chronic kidney disease)    • GERD (gastroesophageal reflux disease)    • Hyperlipidemia    • Hypertension    • Osteoarthritis    • Thrush 01/01/2015   • Type II or unspecified type diabetes mellitus without mention of complication, not stated as uncontrolled        Past Surgical History:        Procedure Laterality Date   • ANTERIOR CERVICAL DISCECTOMY W/ FUSION     • CARPAL TUNNEL RELEASE      bilateral   • COLONOSCOPY     • CT BIOPSY BONE MARROW  9/26/2024    CT BONE MARROW BIOPSY 9/26/2024 [facility] CT SCAN   • ENDOSCOPY, COLON, DIAGNOSTIC     • FINGER TRIGGER RELEASE      right thumb   • HERNIA REPAIR      right inguinal hernia   • LIPOMA RESECTION      Left axillary.   • TONSILLECTOMY AND ADENOIDECTOMY         Social History:    Social History     Tobacco Use   • Smoking status: Never   • Smokeless tobacco: Never   Substance Use Topics   • Alcohol use: No     Alcohol/week: 0.0 standard drinks of alcohol                                Counseling given: Not Answered      Vital Signs (Current): There were no vitals filed for this visit.                                           BP Readings from Last 3 Encounters:   04/14/25 134/76   12/04/24 (!) 143/74   11/19/24 122/62       NPO Status:                                                                                 BMI:   Wt Readings from Last 3 Encounters:   05/20/25 83 kg (183 lb)   04/22/25 83.9 kg (185 lb)   04/14/25 84.4 kg (186 lb)     There is no height or weight on

## 2025-06-02 ENCOUNTER — HOSPITAL ENCOUNTER (OUTPATIENT)
Dept: PHYSICAL THERAPY | Facility: CLINIC | Age: 75
Setting detail: THERAPIES SERIES
Discharge: HOME | End: 2025-06-02
Attending: ORTHOPAEDIC SURGERY
Payer: MEDICARE

## 2025-06-02 PROCEDURE — 97110 THERAPEUTIC EXERCISES: CPT

## 2025-06-02 NOTE — PROGRESS NOTES
prevention  [x]  []  []        []  []  []      Date Addressed:            LTG: To be met in 16 treatments           1. Improve score on assessment tool KOOS from 14% impairment to less than 8% impairment  []  []  []      2. Reduce pain levels to 0/10 or less with ADLs []  []  []      3. ? Strength: Increase LE MMT to 5/5 throughout to ease functional limitations and mobility  []  []  []                                  Patient goals: \"strengthening\"     Pt. Education:  [x] Yes  [] No  [x] Reviewed Prior HEP/Ed  Method of Education: [x] Verbal  [x] Demo  [x] Written:  Access Code: [email or web address]  URL: [email or web address]  Date: 05/12/2025  Prepared by: Kenrick Cali    Exercises  - Supine Active Straight Leg Raise  - 1 x daily - 7 x weekly - 3 sets - 10 reps  - Modified [place] Stretch  - 1 x daily - 7 x weekly - 3 sets - 60 sec hold  - Seated Hamstring Stretch  - 1 x daily - 7 x weekly - 3 sets - 30 sec hold  - Supine Piriformis Stretch with Foot on Ground  - 1 x daily - 7 x weekly - 3 sets - 30 sec hold  - Hooklying Gluteal Sets  - 1 x daily - 7 x weekly - 3 sets - 10 reps  - Supine Bridge  - 1 x daily - 7 x weekly - 3 sets - 10 reps  - Clamshell  - 1 x daily - 7 x weekly - 3 sets - 10 reps  - Sidelying Hip Abduction  - 1 x daily - 7 x weekly - 3 sets - 10 reps  - Prone Gluteal Sets  - 1 x daily - 7 x weekly - 3 sets - 10 reps - 5-10 sec hold  - Standing Hip Abduction with Anchored Resistance  - 1 x daily - 7 x weekly - 3 sets - 10 reps  - Standing Hip Extension with Anchored Resistance  - 1 x daily - 7 x weekly - 3 sets - 10 reps  - Standing Hip Adduction with Anchored Resistance  - 1 x daily - 7 x weekly - 3 sets - 10 reps  - Standing Repeated Hip Flexion with Resistance  - 1 x daily - 7 x weekly - 3 sets - 10 reps  - Standing Terminal Knee Extension with Resistance  - 1 x daily - 7 x weekly - 3 sets - 10 reps - 30 (sec) hold  - Standard Lunge  - 1 x daily - 7 x weekly - 3 sets - 10 reps    Access Code:

## 2025-06-04 ENCOUNTER — HOSPITAL ENCOUNTER (OUTPATIENT)
Dept: PHYSICAL THERAPY | Facility: CLINIC | Age: 75
Setting detail: THERAPIES SERIES
Discharge: HOME | End: 2025-06-04
Attending: ORTHOPAEDIC SURGERY
Payer: MEDICARE

## 2025-06-04 PROCEDURE — 97110 THERAPEUTIC EXERCISES: CPT

## 2025-06-04 NOTE — FLOWSHEET NOTE
[x] [facility]  Outpatient Rehabilitation &  Therapy  [address]   P: [phone]  F: [phone]      Physical Therapy Daily Treatment    Date:  2025  Patient Name:  Kilo Agrawal    :  1950  MRN: [id number]  Physician: Dr. Ismael Murray                                               Insurance: Medicare (secondary MMO, vs BMN)  Medical Diagnosis: Osteoarthritis of left knee, unspecified osteoarthritis type (M17.12)  Osteoarthritis of right knee, unspecified osteoarthritis type (M17.11)  Rehab Codes: M62.81 , R26.89 , M25.561 , M25.562 , M54.5   Onset date: 3/15/25                                         Next 's appt.: --    Visit# / total visits: 10/16; Progress note for Medicare patient due at visit 19    Cancels/No Shows: 0    Subjective:    Pain:  [x] Yes  [] No Location: low back  Pain Rating: (0-10 scale) not rated/10  Pain altered Tx:  [x] No  [] Yes  Action:  Comments: Patient arrived noting R knee is improving but L knee still sore.    Objective:  Modalities:   Precautions [x] No  [] Yes:   Exercise     R Knee Reps/ Time Weight/ Level Comments             NuStep   10'  L3               Calf Stretch Slantboard  3x30\"       Hamstring Stretch   3x30\"       Hip flexor stretch stool  3x30\"               SLR   2x10   Contralateral knee bent    Piriformis stretch          Clamshells     Ward     Bridges   2x10       Sidelying hip abduction    Ward      Squat to mat table      LAQ x20     Supine quadriceps stretch  2x30\"     Posterior pelvic tilts 10x10\"       Posterior pelvic tilts with march  2x10                 4-way hip Tband   x15 ea   Green     Total Gym Squats / HR   x20  L20     Lunges       Step ups   6\"     Step downs    4\"    Lateral tap downs in // bars    4\"    Balance board  3'  L2    Heel raises at step   x20      Other:     Specific Instructions for next treatment: progress strength program per tolerance        Treatment Charges: Mins Units   []

## 2025-06-05 LAB — SURGICAL PATHOLOGY REPORT: NORMAL

## 2025-06-08 ENCOUNTER — HOSPITAL ENCOUNTER (OUTPATIENT)
Facility: CLINIC | Age: 75
Discharge: HOME | End: 2025-06-08
Payer: MEDICARE

## 2025-06-08 LAB
BACTERIA URNS QL MICRO: ABNORMAL
BILIRUB UR QL STRIP: NEGATIVE
CLARITY UR: ABNORMAL
COLOR UR: YELLOW
EPI CELLS #/AREA URNS HPF: ABNORMAL /HPF (ref 0–5)
GLUCOSE UR STRIP-MCNC: ABNORMAL MG/DL
HGB UR QL STRIP.AUTO: ABNORMAL
KETONES UR STRIP-MCNC: NEGATIVE MG/DL
LEUKOCYTE ESTERASE UR QL STRIP: ABNORMAL
NITRITE UR QL STRIP: NEGATIVE
PH UR STRIP: 6 [PH] (ref 5–8)
PROT UR STRIP-MCNC: ABNORMAL MG/DL
RBC #/AREA URNS HPF: ABNORMAL /HPF (ref 0–2)
SP GR UR STRIP: 1.02 (ref 1–1.03)
UROBILINOGEN UR STRIP-ACNC: NORMAL EU/DL (ref 0–1)
WBC #/AREA URNS HPF: ABNORMAL /HPF (ref 0–5)

## 2025-06-08 PROCEDURE — 81001 URINALYSIS AUTO W/SCOPE: CPT

## 2025-06-08 PROCEDURE — 87077 CULTURE AEROBIC IDENTIFY: CPT

## 2025-06-08 PROCEDURE — 87186 SC STD MICRODIL/AGAR DIL: CPT

## 2025-06-08 PROCEDURE — 87086 URINE CULTURE/COLONY COUNT: CPT

## 2025-06-09 ENCOUNTER — HOSPITAL ENCOUNTER (OUTPATIENT)
Dept: PHYSICAL THERAPY | Facility: CLINIC | Age: 75
Setting detail: THERAPIES SERIES
Discharge: HOME | End: 2025-06-09
Attending: ORTHOPAEDIC SURGERY
Payer: MEDICARE

## 2025-06-09 PROCEDURE — 97110 THERAPEUTIC EXERCISES: CPT

## 2025-06-10 LAB
MICROORGANISM SPEC CULT: ABNORMAL
SPECIMEN DESCRIPTION: ABNORMAL

## 2025-06-12 ENCOUNTER — HOSPITAL ENCOUNTER (OUTPATIENT)
Dept: PHYSICAL THERAPY | Facility: CLINIC | Age: 75
Setting detail: THERAPIES SERIES
Discharge: HOME | End: 2025-06-12
Attending: ORTHOPAEDIC SURGERY
Payer: MEDICARE

## 2025-06-12 PROCEDURE — 97110 THERAPEUTIC EXERCISES: CPT

## 2025-06-12 NOTE — FLOWSHEET NOTE
Patient goals: \"strengthening\"     Pt. Education:  [x] Yes  [] No  [x] Reviewed Prior HEP/Ed  Method of Education: [x] Verbal  [x] Demo  [x] Written:  Access Code: [email or web address]  URL: [email or web address]  Date: 05/12/2025  Prepared by: Kenrick Cali    Exercises  - Supine Active Straight Leg Raise  - 1 x daily - 7 x weekly - 3 sets - 10 reps  - Modified [place] Stretch  - 1 x daily - 7 x weekly - 3 sets - 60 sec hold  - Seated Hamstring Stretch  - 1 x daily - 7 x weekly - 3 sets - 30 sec hold  - Supine Piriformis Stretch with Foot on Ground  - 1 x daily - 7 x weekly - 3 sets - 30 sec hold  - Hooklying Gluteal Sets  - 1 x daily - 7 x weekly - 3 sets - 10 reps  - Supine Bridge  - 1 x daily - 7 x weekly - 3 sets - 10 reps  - Clamshell  - 1 x daily - 7 x weekly - 3 sets - 10 reps  - Sidelying Hip Abduction  - 1 x daily - 7 x weekly - 3 sets - 10 reps  - Prone Gluteal Sets  - 1 x daily - 7 x weekly - 3 sets - 10 reps - 5-10 sec hold  - Standing Hip Abduction with Anchored Resistance  - 1 x daily - 7 x weekly - 3 sets - 10 reps  - Standing Hip Extension with Anchored Resistance  - 1 x daily - 7 x weekly - 3 sets - 10 reps  - Standing Hip Adduction with Anchored Resistance  - 1 x daily - 7 x weekly - 3 sets - 10 reps  - Standing Repeated Hip Flexion with Resistance  - 1 x daily - 7 x weekly - 3 sets - 10 reps  - Standing Terminal Knee Extension with Resistance  - 1 x daily - 7 x weekly - 3 sets - 10 reps - 30 (sec) hold  - Standard Lunge  - 1 x daily - 7 x weekly - 3 sets - 10 reps    Access Code: [email or web address]  URL: [email or web address]  Date: 05/07/2025  Prepared by: Roxy Ward    Exercises  - Supine Active Straight Leg Raise  - 1 x daily - 7 x weekly - 3 sets - 10 reps  - Modified [place] Stretch  - 1 x daily - 7 x weekly - 3 sets - 60 sec hold  - Seated Hamstring Stretch  - 1 x daily - 7 x weekly - 3 sets - 30 sec hold  - Supine Piriformis Stretch with Foot on Ground  - 1 x daily - 7 x weekly -

## 2025-06-16 ENCOUNTER — HOSPITAL ENCOUNTER (OUTPATIENT)
Dept: PHYSICAL THERAPY | Facility: CLINIC | Age: 75
Setting detail: THERAPIES SERIES
Discharge: HOME | End: 2025-06-16
Attending: ORTHOPAEDIC SURGERY
Payer: MEDICARE

## 2025-06-16 PROCEDURE — 97110 THERAPEUTIC EXERCISES: CPT

## 2025-06-16 NOTE — FLOWSHEET NOTE
[x] [facility]  Outpatient Rehabilitation &  Therapy  [address]   P: [phone]  F: [phone]      Physical Therapy Daily Treatment    Date:  2025  Patient Name:  Kilo Agrawal    :  1950  MRN: [id number]  Physician: Dr. Ismael Murray                                               Insurance: Medicare (secondary MMO, vs BMN)  Medical Diagnosis: Osteoarthritis of left knee, unspecified osteoarthritis type (M17.12)  Osteoarthritis of right knee, unspecified osteoarthritis type (M17.11)  Rehab Codes: M62.81 , R26.89 , M25.561 , M25.562 , M54.5   Onset date: 3/15/25                                         Next 's appt.: --    Visit# / total visits: ; Progress note for Medicare patient due at visit 19    Cancels/No Shows: 0    Subjective:    Pain:  [x] Yes  [] No Location: low back  Pain Rating: (0-10 scale) not rated/10  Pain altered Tx:  [x] No  [] Yes  Action:  Comments: Patient arrived noting continued improvements in symptoms with no new issues to report.    Objective:  Modalities:   Precautions [x] No  [] Yes:   Exercise     R Knee Reps/ Time Weight/ Level Comments             NuStep   10'  L6               Calf Stretch Slantboard  3x30\"       Hamstring Stretch   3x30\"       Hip flexor stretch stool  3x30\"               SLR   2x10   Contralateral knee bent    Piriformis stretch          Clamshells     [place]     Bridges   2x10       Sidelying hip abduction    [place]      Squat to mat table      LAQ x20     Supine quadriceps stretch  --------  HELD by patient request    Posterior pelvic tilts 10x10\"       Posterior pelvic tilts with march  2x10                 4-way hip Tband   x15 ea   Green     Total Gym Squats / HR   x20  L20     Lunges  2x10      Step ups  2x10  6\"     Step downs  2x10  4\"    Lateral tap downs  x10  4\"    Balance board  3'  L2    Heel raises at step   x20      Other:     Specific Instructions for next treatment: progress strength program

## 2025-06-17 ENCOUNTER — HOSPITAL ENCOUNTER (OUTPATIENT)
Facility: CLINIC | Age: 75
Discharge: HOME | End: 2025-06-17
Payer: MEDICARE

## 2025-06-17 LAB
ANION GAP SERPL CALCULATED.3IONS-SCNC: 11 MMOL/L (ref 9–16)
BUN SERPL-MCNC: 54 MG/DL (ref 8–23)
CALCIUM SERPL-MCNC: 10.3 MG/DL (ref 8.6–10.4)
CHLORIDE SERPL-SCNC: 101 MMOL/L (ref 98–107)
CO2 SERPL-SCNC: 25 MMOL/L (ref 20–31)
CREAT SERPL-MCNC: 2 MG/DL (ref 0.7–1.2)
GFR, ESTIMATED: 34 ML/MIN/1.73M2
GLUCOSE SERPL-MCNC: 111 MG/DL (ref 74–99)
POTASSIUM SERPL-SCNC: 4.8 MMOL/L (ref 3.7–5.3)
SODIUM SERPL-SCNC: 137 MMOL/L (ref 136–145)

## 2025-06-17 PROCEDURE — 36415 COLL VENOUS BLD VENIPUNCTURE: CPT

## 2025-06-17 PROCEDURE — 80048 BASIC METABOLIC PNL TOTAL CA: CPT

## 2025-06-19 ENCOUNTER — HOSPITAL ENCOUNTER (OUTPATIENT)
Dept: PHYSICAL THERAPY | Facility: CLINIC | Age: 75
Setting detail: THERAPIES SERIES
Discharge: HOME | End: 2025-06-19
Attending: ORTHOPAEDIC SURGERY
Payer: MEDICARE

## 2025-06-19 PROCEDURE — 97110 THERAPEUTIC EXERCISES: CPT

## 2025-06-19 NOTE — FLOWSHEET NOTE
1 x daily - 7 x weekly - 3 sets - 30 sec hold  - Hooklying Gluteal Sets  - 1 x daily - 7 x weekly - 3 sets - 10 reps  - Supine Bridge  - 1 x daily - 7 x weekly - 3 sets - 10 reps  - Clamshell  - 1 x daily - 7 x weekly - 3 sets - 10 reps  - Sidelying Hip Abduction  - 1 x daily - 7 x weekly - 3 sets - 10 reps  - Prone Gluteal Sets  - 1 x daily - 7 x weekly - 3 sets - 10 reps - 5-10 sec hold    Comprehension of Education:  [x] Verbalizes understanding.  [x] Demonstrates understanding.  [x] Needs review.  [] Demonstrates/verbalizes HEP/Ed previously given.     Plan: [x] Continue current frequency toward long and short term goals.    [x] Specific Instructions for subsequent treatments: see above       Time In: 1100               Time Out: 1150    Electronically signed by:  Nader Khan, PT

## 2025-06-20 ENCOUNTER — HOSPITAL ENCOUNTER (OUTPATIENT)
Facility: CLINIC | Age: 75
Setting detail: SPECIMEN
Discharge: HOME | End: 2025-06-20
Payer: MEDICARE

## 2025-06-20 PROCEDURE — 81015 MICROSCOPIC EXAM OF URINE: CPT

## 2025-06-20 PROCEDURE — 87086 URINE CULTURE/COLONY COUNT: CPT

## 2025-06-21 LAB
BILIRUB UR QL STRIP: NEGATIVE
CLARITY UR: CLEAR
COLOR UR: YELLOW
EPI CELLS #/AREA URNS HPF: NORMAL /HPF (ref 0–5)
GLUCOSE UR STRIP-MCNC: ABNORMAL MG/DL
HGB UR QL STRIP.AUTO: ABNORMAL
KETONES UR STRIP-MCNC: NEGATIVE MG/DL
LEUKOCYTE ESTERASE UR QL STRIP: ABNORMAL
MICROORGANISM SPEC CULT: NORMAL
NITRITE UR QL STRIP: NEGATIVE
PH UR STRIP: 5.5 [PH] (ref 5–8)
PROT UR STRIP-MCNC: ABNORMAL MG/DL
RBC #/AREA URNS HPF: NORMAL /HPF (ref 0–2)
SP GR UR STRIP: 1.02 (ref 1–1.03)
SPECIMEN DESCRIPTION: NORMAL
UROBILINOGEN UR STRIP-ACNC: NORMAL EU/DL (ref 0–1)
WBC #/AREA URNS HPF: NORMAL /HPF (ref 0–5)

## 2025-06-23 ENCOUNTER — HOSPITAL ENCOUNTER (OUTPATIENT)
Dept: PHYSICAL THERAPY | Facility: CLINIC | Age: 75
Setting detail: THERAPIES SERIES
Discharge: HOME | End: 2025-06-23
Attending: ORTHOPAEDIC SURGERY
Payer: MEDICARE

## 2025-06-23 PROCEDURE — 97110 THERAPEUTIC EXERCISES: CPT

## 2025-06-23 NOTE — FLOWSHEET NOTE
[x] [facility]  Outpatient Rehabilitation &  Therapy  [address]   P: [phone]  F: [phone]      Physical Therapy Daily Treatment    Date:  2025  Patient Name:  Kilo Agrawal    :  1950  MRN: [id number]  Physician: Dr. Ismael Murray                                               Insurance: Medicare (secondary MMO, vs BMN)  Medical Diagnosis: Osteoarthritis of left knee, unspecified osteoarthritis type (M17.12)  Osteoarthritis of right knee, unspecified osteoarthritis type (M17.11)  Rehab Codes: M62.81 , R26.89 , M25.561 , M25.562 , M54.5   Onset date: 3/15/25                                         Next 's appt.: --    Visit# / total visits: 15/16; Progress note for Medicare patient due at visit 19    Cancels/No Shows: 0    Subjective:    Pain:  [x] Yes  [] No Location: low back  Pain Rating: (0-10 scale) not rated/10  Pain altered Tx:  [x] No  [] Yes  Action:  Comments: Patient arrived just noting general fatigue from lack of sleep last night.    Objective:  Modalities:   Precautions [x] No  [] Yes:   Exercise     R Knee Reps/ Time Weight/ Level Comments             NuStep   10'  L6               Calf Stretch Slantboard  3x30\"       Hamstring Stretch   3x30\"       Hip flexor stretch stool  3x30\"               SLR   2x10   Contralateral knee bent    Bridges   2x10       Supine quadriceps stretch  --------  HELD by patient request    Posterior pelvic tilts 10x10\"       Posterior pelvic tilts with march  2x10                 4-way hip Tband   x15 ea   Green     Total Gym Squats / HR   x20  L20     Lunges  2x10      Step ups  2x10  6\"     Step downs  2x10  4\"    Lateral tap downs  x10  4\"    Balance board  3'  L2    Heel raises at step   x20      Other:     Specific Instructions for next treatment: progress strength program per tolerance        Treatment Charges: Mins Units   []  Modalities       [x]  Ther Exercise 40 3   []  Neuromuscular Re-ed     []  Gait

## 2025-06-24 LAB — UROTHELIAL CANCER DETECTION: NORMAL

## 2025-06-25 ENCOUNTER — HOSPITAL ENCOUNTER (OUTPATIENT)
Dept: PHYSICAL THERAPY | Facility: CLINIC | Age: 75
Setting detail: THERAPIES SERIES
Discharge: HOME | End: 2025-06-25
Attending: ORTHOPAEDIC SURGERY
Payer: MEDICARE

## 2025-06-25 PROCEDURE — 97110 THERAPEUTIC EXERCISES: CPT

## 2025-06-25 NOTE — PROGRESS NOTES
[x] [facility]  Outpatient Rehabilitation &  Therapy  [address]   P: [phone]  F: [phone]      Physical Therapy Daily Treatment and Progress Note     Date:  2025  Patient Name:  Kilo Agrawal    :  1950  MRN: [id number]  Physician: Dr. Ismael Murray                                               Insurance: Medicare (secondary MMO, vs BMN)  Medical Diagnosis: Osteoarthritis of left knee, unspecified osteoarthritis type (M17.12)  Osteoarthritis of right knee, unspecified osteoarthritis type (M17.11)  Rehab Codes: M62.81 , R26.89 , M25.561 , M25.562 , M54.5   Onset date: 3/15/25                                         Next 's appt.: --  Visit# / total visits: ; Progress note for Medicare patient due at visit 19    Cancels/No Shows: 0    Subjective:    Pain:  [x] Yes  [] No Location: low back  Pain Rating: (0-10 scale) not rated/10  Pain altered Tx:  [x] No  [] Yes  Action:  Comments: Patient arrives without complaints. He does feel that his gait, posture, and strength is improved overall.     Objective:  Modalities:   Precautions [x] No  [] Yes:   Exercise     R Knee Reps/ Time Weight/ Level Comments             NuStep   10'  L6               Calf Stretch Slantboard  3x30\"       Hamstring Stretch   3x30\"       Hip flexor stretch stool  3x30\"               SLR   2x10   Contralateral knee bent    Bridges   2x10       Supine quadriceps stretch  --------  HELD by patient request    Posterior pelvic tilts 10x10\"       Posterior pelvic tilts with march  2x10                 4-way hip Tband   x15 ea   Blue     Total Gym Squats / HR    L20     Lunges  2x10      Step ups   6\"     Step downs   4\"    Lateral tap downs x20  x10  4\"   6\"    Balance board    L2    Heel raises at step   x20     Other:   6-137 degrees R knee ROM   2-137 degrees L knee ROM    Specific Instructions for next treatment: progress strength program per tolerance        Treatment Charges: Mins 
Prophylactic measure

## 2025-06-27 ENCOUNTER — HOSPITAL ENCOUNTER (OUTPATIENT)
Age: 75
Setting detail: SPECIMEN
Discharge: HOME | End: 2025-06-27

## 2025-07-02 LAB — SURGICAL PATHOLOGY REPORT: NORMAL

## 2025-07-07 ENCOUNTER — HOSPITAL ENCOUNTER (OUTPATIENT)
Facility: CLINIC | Age: 75
Discharge: HOME | End: 2025-07-07
Payer: MEDICARE

## 2025-07-07 LAB
ANION GAP SERPL CALCULATED.3IONS-SCNC: 11 MMOL/L (ref 9–16)
BACTERIA URNS QL MICRO: ABNORMAL
BILIRUB UR QL STRIP: NEGATIVE
BUN SERPL-MCNC: 43 MG/DL (ref 8–23)
CALCIUM SERPL-MCNC: 10.1 MG/DL (ref 8.6–10.4)
CASTS #/AREA URNS LPF: ABNORMAL /LPF (ref 0–8)
CHLORIDE SERPL-SCNC: 100 MMOL/L (ref 98–107)
CLARITY UR: CLEAR
CO2 SERPL-SCNC: 27 MMOL/L (ref 20–31)
COLOR UR: YELLOW
CREAT SERPL-MCNC: 1.8 MG/DL (ref 0.7–1.2)
EPI CELLS #/AREA URNS HPF: ABNORMAL /HPF (ref 0–5)
ERYTHROCYTE [DISTWIDTH] IN BLOOD BY AUTOMATED COUNT: 13.2 % (ref 11.8–14.4)
GFR, ESTIMATED: 39 ML/MIN/1.73M2
GLUCOSE SERPL-MCNC: 163 MG/DL (ref 74–99)
GLUCOSE UR STRIP-MCNC: ABNORMAL MG/DL
HCT VFR BLD AUTO: 38.5 % (ref 40.7–50.3)
HGB BLD-MCNC: 12.9 G/DL (ref 13–17)
HGB UR QL STRIP.AUTO: ABNORMAL
KETONES UR STRIP-MCNC: NEGATIVE MG/DL
LEUKOCYTE ESTERASE UR QL STRIP: ABNORMAL
MCH RBC QN AUTO: 32.6 PG (ref 25.2–33.5)
MCHC RBC AUTO-ENTMCNC: 33.5 G/DL (ref 28.4–34.8)
MCV RBC AUTO: 97.2 FL (ref 82.6–102.9)
NITRITE UR QL STRIP: NEGATIVE
NRBC BLD-RTO: 0 PER 100 WBC
PH UR STRIP: 7 [PH] (ref 5–8)
PLATELET # BLD AUTO: 140 K/UL (ref 138–453)
PMV BLD AUTO: 11.3 FL (ref 8.1–13.5)
POTASSIUM SERPL-SCNC: 4.3 MMOL/L (ref 3.7–5.3)
PROT UR STRIP-MCNC: ABNORMAL MG/DL
RBC # BLD AUTO: 3.96 M/UL (ref 4.21–5.77)
RBC #/AREA URNS HPF: ABNORMAL /HPF (ref 0–4)
SODIUM SERPL-SCNC: 138 MMOL/L (ref 136–145)
SP GR UR STRIP: 1.02 (ref 1–1.03)
UROBILINOGEN UR STRIP-ACNC: NORMAL EU/DL (ref 0–1)
WBC #/AREA URNS HPF: ABNORMAL /HPF (ref 0–5)
WBC OTHER # BLD: 7.2 K/UL (ref 3.5–11.3)
YEAST URNS QL MICRO: ABNORMAL

## 2025-07-07 PROCEDURE — 80048 BASIC METABOLIC PNL TOTAL CA: CPT

## 2025-07-07 PROCEDURE — 87086 URINE CULTURE/COLONY COUNT: CPT

## 2025-07-07 PROCEDURE — 85027 COMPLETE CBC AUTOMATED: CPT

## 2025-07-07 PROCEDURE — 81001 URINALYSIS AUTO W/SCOPE: CPT

## 2025-07-07 PROCEDURE — 36415 COLL VENOUS BLD VENIPUNCTURE: CPT

## 2025-07-08 LAB
MICROORGANISM SPEC CULT: NO GROWTH
SPECIMEN DESCRIPTION: NORMAL

## 2025-07-14 ENCOUNTER — HOSPITAL ENCOUNTER (OUTPATIENT)
Facility: CLINIC | Age: 75
Discharge: HOME | End: 2025-07-14
Payer: MEDICARE

## 2025-07-14 LAB
BACTERIA URNS QL MICRO: NORMAL
BILIRUB UR QL STRIP: NEGATIVE
CASTS #/AREA URNS LPF: NORMAL /LPF (ref 0–8)
CLARITY UR: CLEAR
COLOR UR: YELLOW
EPI CELLS #/AREA URNS HPF: NORMAL /HPF (ref 0–5)
GLUCOSE UR STRIP-MCNC: ABNORMAL MG/DL
HGB UR QL STRIP.AUTO: ABNORMAL
KETONES UR STRIP-MCNC: NEGATIVE MG/DL
LEUKOCYTE ESTERASE UR QL STRIP: ABNORMAL
NITRITE UR QL STRIP: NEGATIVE
PH UR STRIP: 6 [PH] (ref 5–8)
PROT UR STRIP-MCNC: NEGATIVE MG/DL
RBC #/AREA URNS HPF: NORMAL /HPF (ref 0–4)
SP GR UR STRIP: 1.02 (ref 1–1.03)
UROBILINOGEN UR STRIP-ACNC: NORMAL EU/DL (ref 0–1)
WBC #/AREA URNS HPF: NORMAL /HPF (ref 0–5)

## 2025-07-14 PROCEDURE — 81001 URINALYSIS AUTO W/SCOPE: CPT

## 2025-07-21 ENCOUNTER — HOSPITAL ENCOUNTER (OUTPATIENT)
Facility: CLINIC | Age: 75
Discharge: HOME | End: 2025-07-21
Payer: MEDICARE

## 2025-07-21 LAB
ALBUMIN SERPL-MCNC: 4.2 G/DL (ref 3.5–5.2)
ALBUMIN/GLOB SERPL: 1.4 {RATIO} (ref 1–2.5)
ALP SERPL-CCNC: 69 U/L (ref 40–129)
ALT SERPL-CCNC: 20 U/L (ref 10–50)
ANION GAP SERPL CALCULATED.3IONS-SCNC: 12 MMOL/L (ref 9–16)
AST SERPL-CCNC: 16 U/L (ref 10–50)
B2 MICROGLOB SERPL IA-MCNC: 4.4 MG/L
BASOPHILS # BLD: 0.06 K/UL (ref 0–0.2)
BASOPHILS NFR BLD: 1 % (ref 0–2)
BILIRUB SERPL-MCNC: 0.2 MG/DL (ref 0–1.2)
BUN SERPL-MCNC: 46 MG/DL (ref 8–23)
CALCIUM SERPL-MCNC: 10.2 MG/DL (ref 8.6–10.4)
CHLORIDE SERPL-SCNC: 104 MMOL/L (ref 98–107)
CO2 SERPL-SCNC: 25 MMOL/L (ref 20–31)
CREAT SERPL-MCNC: 1.9 MG/DL (ref 0.7–1.2)
EOSINOPHIL # BLD: 0.49 K/UL (ref 0–0.44)
EOSINOPHILS RELATIVE PERCENT: 8 % (ref 1–4)
ERYTHROCYTE [DISTWIDTH] IN BLOOD BY AUTOMATED COUNT: 13.3 % (ref 11.8–14.4)
FREE KAPPA/LAMBDA RATIO: 5.7 (ref 0.22–1.74)
GFR, ESTIMATED: 36 ML/MIN/1.73M2
GLUCOSE SERPL-MCNC: 120 MG/DL (ref 74–99)
HCT VFR BLD AUTO: 38.6 % (ref 40.7–50.3)
HGB BLD-MCNC: 12.8 G/DL (ref 13–17)
IGA SERPL-MCNC: 120 MG/DL (ref 70–400)
IGG SERPL-MCNC: 1386 MG/DL (ref 700–1600)
IGM SERPL-MCNC: 87 MG/DL (ref 40–230)
IMM GRANULOCYTES # BLD AUTO: <0.03 K/UL (ref 0–0.3)
IMM GRANULOCYTES NFR BLD: 0 %
KAPPA LC FREE SER-MCNC: 183 MG/L
LAMBDA LC FREE SERPL-MCNC: 32.1 MG/L (ref 4.2–27.7)
LYMPHOCYTES NFR BLD: 1.53 K/UL (ref 1.1–3.7)
LYMPHOCYTES RELATIVE PERCENT: 24 % (ref 24–43)
MCH RBC QN AUTO: 32.7 PG (ref 25.2–33.5)
MCHC RBC AUTO-ENTMCNC: 33.2 G/DL (ref 28.4–34.8)
MCV RBC AUTO: 98.7 FL (ref 82.6–102.9)
MONOCYTES NFR BLD: 0.66 K/UL (ref 0.1–1.2)
MONOCYTES NFR BLD: 11 % (ref 3–12)
NEUTROPHILS NFR BLD: 56 % (ref 36–65)
NEUTS SEG NFR BLD: 3.55 K/UL (ref 1.5–8.1)
NRBC BLD-RTO: 0 PER 100 WBC
PLATELET # BLD AUTO: 167 K/UL (ref 138–453)
PMV BLD AUTO: 11 FL (ref 8.1–13.5)
POTASSIUM SERPL-SCNC: 4.6 MMOL/L (ref 3.7–5.3)
PROT SERPL-MCNC: 7.2 G/DL (ref 6.6–8.7)
RBC # BLD AUTO: 3.91 M/UL (ref 4.21–5.77)
SODIUM SERPL-SCNC: 141 MMOL/L (ref 136–145)
WBC OTHER # BLD: 6.3 K/UL (ref 3.5–11.3)

## 2025-07-21 PROCEDURE — 85025 COMPLETE CBC W/AUTO DIFF WBC: CPT

## 2025-07-21 PROCEDURE — 80053 COMPREHEN METABOLIC PANEL: CPT

## 2025-07-21 PROCEDURE — 36415 COLL VENOUS BLD VENIPUNCTURE: CPT

## 2025-07-21 PROCEDURE — 86334 IMMUNOFIX E-PHORESIS SERUM: CPT

## 2025-07-21 PROCEDURE — 84155 ASSAY OF PROTEIN SERUM: CPT

## 2025-07-21 PROCEDURE — 82784 ASSAY IGA/IGD/IGG/IGM EACH: CPT

## 2025-07-21 PROCEDURE — 84165 PROTEIN E-PHORESIS SERUM: CPT

## 2025-07-21 PROCEDURE — 82232 ASSAY OF BETA-2 PROTEIN: CPT

## 2025-07-21 PROCEDURE — 83521 IG LIGHT CHAINS FREE EACH: CPT

## 2025-07-22 LAB
ALBUMIN PERCENT: 57 % (ref 56–66)
ALBUMIN SERPL-MCNC: 3.9 G/DL (ref 3.2–5.2)
ALPHA 2 PERCENT: 10 % (ref 7–12)
ALPHA1 GLOB SERPL ELPH-MCNC: 0.4 G/DL (ref 0.1–0.4)
ALPHA1 GLOB SERPL ELPH-MCNC: 6 % (ref 3–5)
ALPHA2 GLOB SERPL ELPH-MCNC: 0.7 G/DL (ref 0.5–0.9)
B-GLOBULIN SERPL ELPH-MCNC: 0.8 G/DL (ref 0.7–1.4)
B-GLOBULIN SERPL ELPH-MCNC: 12 % (ref 8–13)
GAMMA GLOB SERPL ELPH-MCNC: 1.1 G/DL (ref 0.5–1.5)
GAMMA GLOBULIN %: 16 % (ref 11–19)
ITYP INTERPRETATION: NORMAL
PATH REV: NORMAL
PATHOLOGIST: ABNORMAL
PROT PATTERN SERPL ELPH-IMP: ABNORMAL
PROT SERPL-MCNC: 6.9 G/DL (ref 6.6–8.7)
TOTAL PROT. SUM,%: 101 % (ref 98–102)
TOTAL PROT. SUM: 6.9 G/DL (ref 6.3–8.2)

## 2025-08-22 ENCOUNTER — HOSPITAL ENCOUNTER (OUTPATIENT)
Age: 75
Setting detail: SPECIMEN
Discharge: HOME | End: 2025-08-22

## 2025-08-24 LAB
MICROORGANISM SPEC CULT: ABNORMAL
SPECIMEN DESCRIPTION: ABNORMAL

## (undated) DEVICE — SYRINGE MED 10ML LUERLOCK TIP W/O SFTY DISP

## (undated) DEVICE — DRAINBAG,ANTI-REFLUX TOWER,L/F,2000ML,LL: Brand: MEDLINE

## (undated) DEVICE — Y-TYPE TUR/BLADDER IRRIGATION SET, REGULATING CLAMP

## (undated) DEVICE — GLOVE ORANGE PI 7 1/2   MSG9075

## (undated) DEVICE — ST CHARLES CYSTO: Brand: MEDLINE INDUSTRIES, INC.

## (undated) DEVICE — CONE TIP URETERAL CATHETER: Brand: URETERAL CATHETER

## (undated) DEVICE — SOL IRR SOD CHL 0.9% TITAN XL CNTNR 3000ML

## (undated) DEVICE — DUAL LUMEN URETERAL CATHETER

## (undated) DEVICE — GUIDEWIRE URO L150CM DIA .035IN STIFF NIT HYDRPHL STR TIP

## (undated) DEVICE — SYRINGE,PISTON,IRRIGATION,60ML,STERILE: Brand: MEDLINE

## (undated) DEVICE — CUTTING LOOP, BIPOLAR, 0.30MM, 24/26 FR.: Brand: N.A.

## (undated) DEVICE — PAD,NON-ADHERENT,3X8,STERILE,LF,1/PK: Brand: MEDLINE

## (undated) DEVICE — BLADDER EVACUATOR: Brand: UROVAC BLADDER EVACUATOR

## (undated) DEVICE — SYRINGE MED 20ML STD CLR PLAS LUERLOCK TIP N CTRL DISP

## (undated) DEVICE — STRAP,CATHETER,ELASTIC,HOOK&LOOP: Brand: MEDLINE

## (undated) DEVICE — SOLUTION IRRIG 1000ML H2O PIC PLAS SHATTERPROOF CONTAINER

## (undated) DEVICE — SOLUTION IV 1000 ML 0.9 NACL INJ USP EXCEL PLAS CONTAINER